# Patient Record
Sex: MALE | Race: WHITE | NOT HISPANIC OR LATINO | Employment: FULL TIME | URBAN - METROPOLITAN AREA
[De-identification: names, ages, dates, MRNs, and addresses within clinical notes are randomized per-mention and may not be internally consistent; named-entity substitution may affect disease eponyms.]

---

## 2023-10-19 ENCOUNTER — TELEPHONE (OUTPATIENT)
Dept: PRIMARY CARE | Facility: CLINIC | Age: 63
End: 2023-10-19
Payer: COMMERCIAL

## 2023-10-27 ENCOUNTER — TELEPHONE (OUTPATIENT)
Dept: PRIMARY CARE | Facility: CLINIC | Age: 63
End: 2023-10-27
Payer: COMMERCIAL

## 2024-02-01 ENCOUNTER — APPOINTMENT (OUTPATIENT)
Dept: RADIOLOGY | Facility: HOSPITAL | Age: 64
DRG: 190 | End: 2024-02-01
Payer: COMMERCIAL

## 2024-02-01 ENCOUNTER — HOSPITAL ENCOUNTER (INPATIENT)
Facility: HOSPITAL | Age: 64
LOS: 4 days | Discharge: HOME | DRG: 190 | End: 2024-02-05
Attending: INTERNAL MEDICINE | Admitting: INTERNAL MEDICINE
Payer: COMMERCIAL

## 2024-02-01 DIAGNOSIS — J93.9 PNEUMOTHORAX: Primary | ICD-10-CM

## 2024-02-01 DIAGNOSIS — J18.9 PNEUMONIA OF RIGHT MIDDLE LOBE DUE TO INFECTIOUS ORGANISM: ICD-10-CM

## 2024-02-01 PROBLEM — J44.1 CHRONIC OBSTRUCTIVE PULMONARY DISEASE WITH ACUTE EXACERBATION (MULTI): Status: ACTIVE | Noted: 2024-02-01

## 2024-02-01 PROBLEM — J93.11 PRIMARY SPONTANEOUS PNEUMOTHORAX: Status: ACTIVE | Noted: 2024-02-01

## 2024-02-01 LAB
ANION GAP SERPL CALC-SCNC: 14 MMOL/L (ref 10–20)
BUN SERPL-MCNC: 17 MG/DL (ref 6–23)
CALCIUM SERPL-MCNC: 9.6 MG/DL (ref 8.6–10.3)
CARDIAC TROPONIN I PNL SERPL HS: 5 NG/L (ref 0–20)
CHLORIDE SERPL-SCNC: 99 MMOL/L (ref 98–107)
CO2 SERPL-SCNC: 27 MMOL/L (ref 21–32)
CREAT SERPL-MCNC: 0.97 MG/DL (ref 0.5–1.3)
EGFRCR SERPLBLD CKD-EPI 2021: 87 ML/MIN/1.73M*2
ERYTHROCYTE [DISTWIDTH] IN BLOOD BY AUTOMATED COUNT: 12.4 % (ref 11.5–14.5)
GLUCOSE SERPL-MCNC: 102 MG/DL (ref 74–99)
HCT VFR BLD AUTO: 47.5 % (ref 41–52)
HGB BLD-MCNC: 16.1 G/DL (ref 13.5–17.5)
MCH RBC QN AUTO: 32.2 PG (ref 26–34)
MCHC RBC AUTO-ENTMCNC: 33.9 G/DL (ref 32–36)
MCV RBC AUTO: 95 FL (ref 80–100)
NRBC BLD-RTO: 0 /100 WBCS (ref 0–0)
PLATELET # BLD AUTO: 239 X10*3/UL (ref 150–450)
POTASSIUM SERPL-SCNC: 4.4 MMOL/L (ref 3.5–5.3)
RBC # BLD AUTO: 5 X10*6/UL (ref 4.5–5.9)
SODIUM SERPL-SCNC: 136 MMOL/L (ref 136–145)
WBC # BLD AUTO: 12.9 X10*3/UL (ref 4.4–11.3)

## 2024-02-01 PROCEDURE — 2500000002 HC RX 250 W HCPCS SELF ADMINISTERED DRUGS (ALT 637 FOR MEDICARE OP, ALT 636 FOR OP/ED): Performed by: PHYSICIAN ASSISTANT

## 2024-02-01 PROCEDURE — 71045 X-RAY EXAM CHEST 1 VIEW: CPT

## 2024-02-01 PROCEDURE — 36415 COLL VENOUS BLD VENIPUNCTURE: CPT | Performed by: PHYSICIAN ASSISTANT

## 2024-02-01 PROCEDURE — 85027 COMPLETE CBC AUTOMATED: CPT | Performed by: PHYSICIAN ASSISTANT

## 2024-02-01 PROCEDURE — 94640 AIRWAY INHALATION TREATMENT: CPT

## 2024-02-01 PROCEDURE — 99223 1ST HOSP IP/OBS HIGH 75: CPT | Performed by: PHYSICIAN ASSISTANT

## 2024-02-01 PROCEDURE — 80048 BASIC METABOLIC PNL TOTAL CA: CPT | Performed by: PHYSICIAN ASSISTANT

## 2024-02-01 PROCEDURE — 2500000005 HC RX 250 GENERAL PHARMACY W/O HCPCS: Performed by: PHYSICIAN ASSISTANT

## 2024-02-01 PROCEDURE — 84484 ASSAY OF TROPONIN QUANT: CPT | Performed by: PHYSICIAN ASSISTANT

## 2024-02-01 PROCEDURE — 1200000002 HC GENERAL ROOM WITH TELEMETRY DAILY

## 2024-02-01 PROCEDURE — 71045 X-RAY EXAM CHEST 1 VIEW: CPT | Performed by: RADIOLOGY

## 2024-02-01 PROCEDURE — 2500000001 HC RX 250 WO HCPCS SELF ADMINISTERED DRUGS (ALT 637 FOR MEDICARE OP): Performed by: PHYSICIAN ASSISTANT

## 2024-02-01 PROCEDURE — 99223 1ST HOSP IP/OBS HIGH 75: CPT | Performed by: INTERNAL MEDICINE

## 2024-02-01 PROCEDURE — 2500000002 HC RX 250 W HCPCS SELF ADMINISTERED DRUGS (ALT 637 FOR MEDICARE OP, ALT 636 FOR OP/ED): Performed by: PHARMACIST

## 2024-02-01 RX ORDER — IPRATROPIUM BROMIDE AND ALBUTEROL SULFATE 2.5; .5 MG/3ML; MG/3ML
3 SOLUTION RESPIRATORY (INHALATION)
Status: DISCONTINUED | OUTPATIENT
Start: 2024-02-01 | End: 2024-02-01

## 2024-02-01 RX ORDER — ACETAMINOPHEN 650 MG/1
650 SUPPOSITORY RECTAL EVERY 4 HOURS PRN
Status: DISCONTINUED | OUTPATIENT
Start: 2024-02-01 | End: 2024-02-05 | Stop reason: HOSPADM

## 2024-02-01 RX ORDER — PANTOPRAZOLE SODIUM 40 MG/10ML
40 INJECTION, POWDER, LYOPHILIZED, FOR SOLUTION INTRAVENOUS
Status: DISCONTINUED | OUTPATIENT
Start: 2024-02-02 | End: 2024-02-04

## 2024-02-01 RX ORDER — ACETAMINOPHEN 325 MG/1
650 TABLET ORAL EVERY 4 HOURS PRN
Status: DISCONTINUED | OUTPATIENT
Start: 2024-02-01 | End: 2024-02-05 | Stop reason: HOSPADM

## 2024-02-01 RX ORDER — FORMOTEROL FUMARATE DIHYDRATE 20 UG/2ML
20 SOLUTION RESPIRATORY (INHALATION)
Status: DISCONTINUED | OUTPATIENT
Start: 2024-02-01 | End: 2024-02-04

## 2024-02-01 RX ORDER — HEPARIN SODIUM 5000 [USP'U]/ML
5000 INJECTION, SOLUTION INTRAVENOUS; SUBCUTANEOUS ONCE
Status: DISCONTINUED | OUTPATIENT
Start: 2024-02-01 | End: 2024-02-02

## 2024-02-01 RX ORDER — ALBUTEROL SULFATE 0.83 MG/ML
2.5 SOLUTION RESPIRATORY (INHALATION) EVERY 4 HOURS PRN
Status: DISCONTINUED | OUTPATIENT
Start: 2024-02-01 | End: 2024-02-04

## 2024-02-01 RX ORDER — TRAMADOL HYDROCHLORIDE 50 MG/1
25 TABLET ORAL EVERY 6 HOURS PRN
Status: DISCONTINUED | OUTPATIENT
Start: 2024-02-01 | End: 2024-02-04

## 2024-02-01 RX ORDER — PANTOPRAZOLE SODIUM 40 MG/1
40 TABLET, DELAYED RELEASE ORAL
Status: DISCONTINUED | OUTPATIENT
Start: 2024-02-02 | End: 2024-02-04

## 2024-02-01 RX ORDER — IPRATROPIUM BROMIDE AND ALBUTEROL SULFATE 2.5; .5 MG/3ML; MG/3ML
3 SOLUTION RESPIRATORY (INHALATION)
Status: DISCONTINUED | OUTPATIENT
Start: 2024-02-02 | End: 2024-02-04

## 2024-02-01 RX ORDER — SENNOSIDES 8.6 MG/1
2 TABLET ORAL 2 TIMES DAILY
Status: DISCONTINUED | OUTPATIENT
Start: 2024-02-01 | End: 2024-02-05 | Stop reason: HOSPADM

## 2024-02-01 RX ORDER — ALBUTEROL SULFATE 90 UG/1
2 AEROSOL, METERED RESPIRATORY (INHALATION) EVERY 4 HOURS PRN
COMMUNITY

## 2024-02-01 RX ORDER — ALBUTEROL SULFATE 0.63 MG/3ML
0.63 SOLUTION RESPIRATORY (INHALATION) EVERY 6 HOURS PRN
COMMUNITY
Start: 2023-11-02

## 2024-02-01 RX ORDER — BUDESONIDE 0.5 MG/2ML
0.5 INHALANT ORAL
Status: DISCONTINUED | OUTPATIENT
Start: 2024-02-01 | End: 2024-02-04

## 2024-02-01 RX ORDER — ONDANSETRON 4 MG/1
4 TABLET, ORALLY DISINTEGRATING ORAL EVERY 8 HOURS PRN
Status: DISCONTINUED | OUTPATIENT
Start: 2024-02-01 | End: 2024-02-05 | Stop reason: HOSPADM

## 2024-02-01 RX ORDER — TALC
3 POWDER (GRAM) TOPICAL DAILY
Status: DISCONTINUED | OUTPATIENT
Start: 2024-02-01 | End: 2024-02-05 | Stop reason: HOSPADM

## 2024-02-01 RX ORDER — FLUTICASONE FUROATE AND VILANTEROL 100; 25 UG/1; UG/1
1 POWDER RESPIRATORY (INHALATION)
Status: DISCONTINUED | OUTPATIENT
Start: 2024-02-01 | End: 2024-02-01

## 2024-02-01 RX ORDER — OXYCODONE HYDROCHLORIDE 5 MG/1
5 TABLET ORAL EVERY 4 HOURS PRN
Status: DISCONTINUED | OUTPATIENT
Start: 2024-02-01 | End: 2024-02-05 | Stop reason: HOSPADM

## 2024-02-01 RX ORDER — ACETAMINOPHEN 160 MG/5ML
650 SOLUTION ORAL EVERY 4 HOURS PRN
Status: DISCONTINUED | OUTPATIENT
Start: 2024-02-01 | End: 2024-02-05 | Stop reason: HOSPADM

## 2024-02-01 RX ORDER — HEPARIN SODIUM 5000 [USP'U]/ML
5000 INJECTION, SOLUTION INTRAVENOUS; SUBCUTANEOUS EVERY 8 HOURS
Status: DISCONTINUED | OUTPATIENT
Start: 2024-02-01 | End: 2024-02-04

## 2024-02-01 RX ORDER — ONDANSETRON HYDROCHLORIDE 2 MG/ML
4 INJECTION, SOLUTION INTRAVENOUS EVERY 8 HOURS PRN
Status: DISCONTINUED | OUTPATIENT
Start: 2024-02-01 | End: 2024-02-05 | Stop reason: HOSPADM

## 2024-02-01 RX ADMIN — OXYCODONE HYDROCHLORIDE 5 MG: 5 TABLET ORAL at 20:57

## 2024-02-01 RX ADMIN — FORMOTEROL FUMARATE DIHYDRATE 20 MCG: 20 SOLUTION RESPIRATORY (INHALATION) at 20:14

## 2024-02-01 RX ADMIN — IPRATROPIUM BROMIDE AND ALBUTEROL SULFATE 3 ML: 2.5; .5 SOLUTION RESPIRATORY (INHALATION) at 20:13

## 2024-02-01 RX ADMIN — Medication 3 L/MIN: at 18:15

## 2024-02-01 RX ADMIN — BUDESONIDE 0.5 MG: 0.5 INHALANT RESPIRATORY (INHALATION) at 20:14

## 2024-02-01 SDOH — SOCIAL STABILITY: SOCIAL INSECURITY: ARE THERE ANY APPARENT SIGNS OF INJURIES/BEHAVIORS THAT COULD BE RELATED TO ABUSE/NEGLECT?: NO

## 2024-02-01 SDOH — SOCIAL STABILITY: SOCIAL INSECURITY: ARE YOU OR HAVE YOU BEEN THREATENED OR ABUSED PHYSICALLY, EMOTIONALLY, OR SEXUALLY BY ANYONE?: NO

## 2024-02-01 SDOH — SOCIAL STABILITY: SOCIAL INSECURITY: HAVE YOU HAD THOUGHTS OF HARMING ANYONE ELSE?: NO

## 2024-02-01 SDOH — SOCIAL STABILITY: SOCIAL INSECURITY: WERE YOU ABLE TO COMPLETE ALL THE BEHAVIORAL HEALTH SCREENINGS?: YES

## 2024-02-01 SDOH — SOCIAL STABILITY: SOCIAL INSECURITY: ABUSE: ADULT

## 2024-02-01 SDOH — SOCIAL STABILITY: SOCIAL INSECURITY: DO YOU FEEL UNSAFE GOING BACK TO THE PLACE WHERE YOU ARE LIVING?: NO

## 2024-02-01 SDOH — SOCIAL STABILITY: SOCIAL INSECURITY: HAS ANYONE EVER THREATENED TO HURT YOUR FAMILY OR YOUR PETS?: NO

## 2024-02-01 SDOH — SOCIAL STABILITY: SOCIAL INSECURITY: DOES ANYONE TRY TO KEEP YOU FROM HAVING/CONTACTING OTHER FRIENDS OR DOING THINGS OUTSIDE YOUR HOME?: NO

## 2024-02-01 SDOH — SOCIAL STABILITY: SOCIAL INSECURITY: DO YOU FEEL ANYONE HAS EXPLOITED OR TAKEN ADVANTAGE OF YOU FINANCIALLY OR OF YOUR PERSONAL PROPERTY?: NO

## 2024-02-01 ASSESSMENT — COGNITIVE AND FUNCTIONAL STATUS - GENERAL
MOBILITY SCORE: 24
PATIENT BASELINE BEDBOUND: NO
MOBILITY SCORE: 24
DAILY ACTIVITIY SCORE: 24
MOBILITY SCORE: 24

## 2024-02-01 ASSESSMENT — LIFESTYLE VARIABLES
AUDIT-C TOTAL SCORE: 0
SUBSTANCE_ABUSE_PAST_12_MONTHS: NO
AUDIT-C TOTAL SCORE: 0
HOW OFTEN DO YOU HAVE A DRINK CONTAINING ALCOHOL: NEVER
PRESCIPTION_ABUSE_PAST_12_MONTHS: NO
SKIP TO QUESTIONS 9-10: 1
HOW MANY STANDARD DRINKS CONTAINING ALCOHOL DO YOU HAVE ON A TYPICAL DAY: PATIENT DOES NOT DRINK
HOW OFTEN DO YOU HAVE 6 OR MORE DRINKS ON ONE OCCASION: NEVER

## 2024-02-01 ASSESSMENT — PAIN - FUNCTIONAL ASSESSMENT
PAIN_FUNCTIONAL_ASSESSMENT: 0-10

## 2024-02-01 ASSESSMENT — ACTIVITIES OF DAILY LIVING (ADL)
DRESSING YOURSELF: INDEPENDENT
JUDGMENT_ADEQUATE_SAFELY_COMPLETE_DAILY_ACTIVITIES: YES
PATIENT'S MEMORY ADEQUATE TO SAFELY COMPLETE DAILY ACTIVITIES?: YES
FEEDING YOURSELF: INDEPENDENT
HEARING - LEFT EAR: FUNCTIONAL
GROOMING: INDEPENDENT
BATHING: INDEPENDENT
ADEQUATE_TO_COMPLETE_ADL: YES
TOILETING: INDEPENDENT
HEARING - RIGHT EAR: FUNCTIONAL
WALKS IN HOME: INDEPENDENT

## 2024-02-01 ASSESSMENT — COLUMBIA-SUICIDE SEVERITY RATING SCALE - C-SSRS
1. IN THE PAST MONTH, HAVE YOU WISHED YOU WERE DEAD OR WISHED YOU COULD GO TO SLEEP AND NOT WAKE UP?: NO
6. HAVE YOU EVER DONE ANYTHING, STARTED TO DO ANYTHING, OR PREPARED TO DO ANYTHING TO END YOUR LIFE?: NO
2. HAVE YOU ACTUALLY HAD ANY THOUGHTS OF KILLING YOURSELF?: NO

## 2024-02-01 ASSESSMENT — PAIN SCALES - GENERAL
PAINLEVEL_OUTOF10: 0 - NO PAIN
PAINLEVEL_OUTOF10: 0 - NO PAIN
PAINLEVEL_OUTOF10: 7

## 2024-02-01 ASSESSMENT — PATIENT HEALTH QUESTIONNAIRE - PHQ9
1. LITTLE INTEREST OR PLEASURE IN DOING THINGS: NOT AT ALL
2. FEELING DOWN, DEPRESSED OR HOPELESS: NOT AT ALL
SUM OF ALL RESPONSES TO PHQ9 QUESTIONS 1 & 2: 0

## 2024-02-01 ASSESSMENT — PAIN DESCRIPTION - ORIENTATION: ORIENTATION: RIGHT

## 2024-02-01 ASSESSMENT — PAIN DESCRIPTION - LOCATION: LOCATION: CHEST

## 2024-02-01 NOTE — CARE PLAN
The patient's goals for the shift include  no SOB    The clinical goals for the shift include Pt will remain HDS      Problem: Pain  Goal: My pain/discomfort is manageable  Outcome: Progressing     Problem: Safety  Goal: I will remain free of falls  Outcome: Progressing     Problem: Daily Care  Goal: Daily care needs are met  Outcome: Progressing     Problem: Psychosocial Needs  Goal: Demonstrates ability to cope with hospitalization/illness  Outcome: Progressing

## 2024-02-01 NOTE — CONSULTS
Inpatient consult to Pulmonology  Consult performed by: Yasmine Richard MD  Consult ordered by: Chandrika Delacruz PA-C      Reason For Consult  COPD and spontaneous pneumothorax  History Of Present Illness  Santi Rivera is a 63 y.o. male  with h/o COPD, GERD, SBO, who p/w acute onset SOB x 1 which started while walking his dogs to OSH ED. SOB both at rest and with activity, associated with chest tightness, and severe to the point it would prevent him from sleeping. In the ED work up revealed R sided spontaneous pneumothorax, subsequently R sided CT was placed. Given need for CT management, he was placed on 4L NC and transferred to [facility] for further management. Pulmonary is consulted for COPD and chest tube management.     States he developed a sudden onset SOB yesterday while walking his dogs. Associated with CP, both at rest and with activity. symptoms now improved. At baseline no SOB at rest. But XIE after walking 1/2 city blocks or climbing 1/2 FOS. Denies orthopnea, PND or LE edema. XIE associated with wheezing. Both improve with albuterol.   Also complains of a chronic cough, productive of clear sputum. No hemoptysis.   Full ROS as below.    Past Medical History  History reviewed. No pertinent past medical history.  Surgical History  Past Surgical History:   Procedure Laterality Date    ABDOMINAL ADHESION SURGERY  12/04/2017    Laparoscopic Lysis Of Intestinal Adhesions    EYE SURGERY  12/07/2017    Eye Surgery    HERNIA REPAIR  12/07/2017    Hernia Repair   Social History  Active smoker, 1 PPD x 45 years h/o smoking, , no known exposures.   Social History     Tobacco Use    Smoking status: Every Day     Packs/day: 0.50     Years: 45.00     Additional pack years: 0.00     Total pack years: 22.50     Types: Cigarettes    Smokeless tobacco: Never   Substance Use Topics    Alcohol use: Not Currently   Family History  +ve for cancer and COPD.  Current Outpatient Medications   Medication Instructions     albuterol 90 mcg/actuation inhaler 2 puffs, inhalation, Every 4 hours PRN    albuterol 0.63 mg, nebulization, Every 6 hours PRN    fluticasone-umeclidin-vilanter (TRELEGY-ELLIPTA) 100-62.5-25 mcg blister with device 1 puff, inhalation, Daily   Allergies  Amoxicillin  Review of Systems   Constitutional:  Positive for fatigue. Negative for appetite change, chills, fever and unexpected weight change.   HENT:  Positive for rhinorrhea. Negative for congestion, postnasal drip, sinus pressure, sinus pain and sore throat.    Eyes:  Negative for pain, redness, itching and visual disturbance.   Respiratory:  Positive for cough, chest tightness, shortness of breath and wheezing.    Cardiovascular:  Positive for chest pain. Negative for palpitations and leg swelling.   Gastrointestinal:  Positive for abdominal pain. Negative for constipation, diarrhea, nausea and vomiting.   Genitourinary:  Negative for dysuria, frequency and hematuria.   Musculoskeletal:  Negative for arthralgias, back pain, myalgias and neck pain.   Skin:  Negative for pallor, rash and wound.   Neurological:  Negative for dizziness, seizures, syncope, light-headedness and headaches.   Psychiatric/Behavioral:  Negative for confusion and dysphoric mood. The patient is not nervous/anxious.    Scheduled medications  budesonide, 0.5 mg, nebulization, BID  formoterol, 20 mcg, nebulization, BID  heparin (porcine), 5,000 Units, subcutaneous, q8h  heparin (porcine), 5,000 Units, subcutaneous, Once  ipratropium-albuteroL, 3 mL, nebulization, q6h  melatonin, 3 mg, oral, Daily  [START ON 2/2/2024] pantoprazole, 40 mg, oral, Daily before breakfast   Or  [START ON 2/2/2024] pantoprazole, 40 mg, intravenous, Daily before breakfast  sennosides, 2 tablet, oral, BID  tiotropium, 2 Inhalation, inhalation, Daily    Continuous medications  oxygen, , Last Rate: 3 L/min (02/01/24 1815)    PRN medications  PRN medications: acetaminophen **OR** acetaminophen **OR** acetaminophen,  "albuterol, ondansetron ODT **OR** ondansetron, oxyCODONE, traMADol   Physical Exam  Constitutional:       General: He is not in acute distress.     Appearance: Normal appearance. He is not ill-appearing or toxic-appearing.      Comments: Thin   HENT:      Head: Normocephalic and atraumatic.      Nose:      Comments: On 4L NC.      Mouth/Throat:      Mouth: Mucous membranes are moist.      Comments: Mallampati 3-4, poor oral intake.   Eyes:      General: No scleral icterus.     Extraocular Movements: Extraocular movements intact.      Pupils: Pupils are equal, round, and reactive to light.   Cardiovascular:      Rate and Rhythm: Normal rate and regular rhythm.      Heart sounds: No murmur heard.     No friction rub. No gallop.   Pulmonary:      Effort: Pulmonary effort is normal. No respiratory distress.      Breath sounds: Rhonchi present. No wheezing or rales.      Comments: R CT in place, without air leak. Poor air entry with bibasilar rhonchi.   Abdominal:      General: Abdomen is flat. There is no distension.      Palpations: Abdomen is soft.      Tenderness: There is no abdominal tenderness.   Musculoskeletal:         General: No swelling or tenderness. Normal range of motion.      Cervical back: Normal range of motion and neck supple. No rigidity.      Right lower leg: No edema.      Left lower leg: No edema.   Lymphadenopathy:      Cervical: No cervical adenopathy.   Skin:     General: Skin is warm and dry.      Coloration: Skin is not jaundiced.      Findings: No bruising.   Neurological:      General: No focal deficit present.      Mental Status: He is alert and oriented to person, place, and time.      Cranial Nerves: No cranial nerve deficit.      Motor: No weakness.   Psychiatric:         Mood and Affect: Mood normal.         Behavior: Behavior normal.     Vital Signs  Blood pressure 109/51, pulse 85, temperature 35.9 °C (96.6 °F), temperature source Temporal, resp. rate 18, height 1.753 m (5' 9.02\"), " weight 50.6 kg (111 lb 8.8 oz), SpO2 97 %.  Oxygen Therapy  SpO2: 97 %           Relevant Results  No results found for this or any previous visit (from the past 24 hour(s)).   No results found.      Assessment/Plan   63 YOM with h/o COPD, GERD, SBO, who p/w acute onset SOB x 1 which started while walking his dogs to OSH ED. SOB both at rest and with activity, associated with chest tightness, and severe to the point it would prevent him from sleeping. In the ED work up revealed R sided spontaneous pneumothorax, subsequently R sided CT was placed. Given need for CT management, he was placed on 4L NC and transferred to [facility] for further management. Pulmonary is consulted for COPD and chest tube management.     Pneumothorax: spontaneous, first episode, likely due to a bullae, s/p R sided CT placement with near complete resolution      Continue CT to wall suction -20      Daily chest CT, if no recurrence, will place to water seal tomorrow      Might need further work up including chest CT      Pain control    COPD: per notes, sees Dr. Johnson as outpatient, currently on Trelegy and albuterol at home, infrequent use of albuterol. No PFT in our EMR. CT report from 2023 read as severe emphysema      Continue Budesonide, formoterol and Spiriva      Duo-Neb      Would DC on home inhalers on DC      FU with private pulmonologist after DC    Respiratory failure: acute with hypoxic due to above.      Continue supplemental O2, wean off as tolerates      Home O2 evaluation before DC    Smoking: active smoker      Smoking cessation counseling      Nicotine patch if needed    DVT prophylaxis: with subcutaneous heparin    Thank you for the consult.  Pulmonary will FU while in house.     Yasmine Richard MD

## 2024-02-02 ENCOUNTER — APPOINTMENT (OUTPATIENT)
Dept: RADIOLOGY | Facility: HOSPITAL | Age: 64
DRG: 190 | End: 2024-02-02
Payer: COMMERCIAL

## 2024-02-02 LAB
ANION GAP SERPL CALC-SCNC: 13 MMOL/L (ref 10–20)
BUN SERPL-MCNC: 18 MG/DL (ref 6–23)
CALCIUM SERPL-MCNC: 8.7 MG/DL (ref 8.6–10.3)
CHLORIDE SERPL-SCNC: 102 MMOL/L (ref 98–107)
CO2 SERPL-SCNC: 24 MMOL/L (ref 21–32)
CREAT SERPL-MCNC: 0.85 MG/DL (ref 0.5–1.3)
EGFRCR SERPLBLD CKD-EPI 2021: >90 ML/MIN/1.73M*2
ERYTHROCYTE [DISTWIDTH] IN BLOOD BY AUTOMATED COUNT: 12.3 % (ref 11.5–14.5)
GLUCOSE SERPL-MCNC: 96 MG/DL (ref 74–99)
HCT VFR BLD AUTO: 43.4 % (ref 41–52)
HGB BLD-MCNC: 14.3 G/DL (ref 13.5–17.5)
MCH RBC QN AUTO: 32.4 PG (ref 26–34)
MCHC RBC AUTO-ENTMCNC: 32.9 G/DL (ref 32–36)
MCV RBC AUTO: 98 FL (ref 80–100)
NRBC BLD-RTO: 0 /100 WBCS (ref 0–0)
PLATELET # BLD AUTO: 127 X10*3/UL (ref 150–450)
POTASSIUM SERPL-SCNC: 4.8 MMOL/L (ref 3.5–5.3)
RBC # BLD AUTO: 4.41 X10*6/UL (ref 4.5–5.9)
SODIUM SERPL-SCNC: 134 MMOL/L (ref 136–145)
WBC # BLD AUTO: 13.7 X10*3/UL (ref 4.4–11.3)

## 2024-02-02 PROCEDURE — 99232 SBSQ HOSP IP/OBS MODERATE 35: CPT | Performed by: STUDENT IN AN ORGANIZED HEALTH CARE EDUCATION/TRAINING PROGRAM

## 2024-02-02 PROCEDURE — 94640 AIRWAY INHALATION TREATMENT: CPT

## 2024-02-02 PROCEDURE — 80048 BASIC METABOLIC PNL TOTAL CA: CPT | Performed by: PHYSICIAN ASSISTANT

## 2024-02-02 PROCEDURE — 2500000005 HC RX 250 GENERAL PHARMACY W/O HCPCS: Performed by: PHYSICIAN ASSISTANT

## 2024-02-02 PROCEDURE — 2500000002 HC RX 250 W HCPCS SELF ADMINISTERED DRUGS (ALT 637 FOR MEDICARE OP, ALT 636 FOR OP/ED): Performed by: INTERNAL MEDICINE

## 2024-02-02 PROCEDURE — 2500000002 HC RX 250 W HCPCS SELF ADMINISTERED DRUGS (ALT 637 FOR MEDICARE OP, ALT 636 FOR OP/ED): Performed by: PHARMACIST

## 2024-02-02 PROCEDURE — 85027 COMPLETE CBC AUTOMATED: CPT | Performed by: PHYSICIAN ASSISTANT

## 2024-02-02 PROCEDURE — 2500000001 HC RX 250 WO HCPCS SELF ADMINISTERED DRUGS (ALT 637 FOR MEDICARE OP): Performed by: PHYSICIAN ASSISTANT

## 2024-02-02 PROCEDURE — 71045 X-RAY EXAM CHEST 1 VIEW: CPT

## 2024-02-02 PROCEDURE — 2500000002 HC RX 250 W HCPCS SELF ADMINISTERED DRUGS (ALT 637 FOR MEDICARE OP, ALT 636 FOR OP/ED): Performed by: PHYSICIAN ASSISTANT

## 2024-02-02 PROCEDURE — 36415 COLL VENOUS BLD VENIPUNCTURE: CPT | Performed by: PHYSICIAN ASSISTANT

## 2024-02-02 PROCEDURE — 1200000002 HC GENERAL ROOM WITH TELEMETRY DAILY

## 2024-02-02 PROCEDURE — 99233 SBSQ HOSP IP/OBS HIGH 50: CPT | Performed by: INTERNAL MEDICINE

## 2024-02-02 RX ADMIN — FORMOTEROL FUMARATE DIHYDRATE 20 MCG: 20 SOLUTION RESPIRATORY (INHALATION) at 08:43

## 2024-02-02 RX ADMIN — OXYCODONE HYDROCHLORIDE 5 MG: 5 TABLET ORAL at 12:12

## 2024-02-02 RX ADMIN — IPRATROPIUM BROMIDE AND ALBUTEROL SULFATE 3 ML: 2.5; .5 SOLUTION RESPIRATORY (INHALATION) at 15:39

## 2024-02-02 RX ADMIN — TIOTROPIUM BROMIDE INHALATION SPRAY 2 PUFF: 3.12 SPRAY, METERED RESPIRATORY (INHALATION) at 09:06

## 2024-02-02 RX ADMIN — IPRATROPIUM BROMIDE AND ALBUTEROL SULFATE 3 ML: 2.5; .5 SOLUTION RESPIRATORY (INHALATION) at 08:44

## 2024-02-02 RX ADMIN — FORMOTEROL FUMARATE DIHYDRATE 20 MCG: 20 SOLUTION RESPIRATORY (INHALATION) at 19:23

## 2024-02-02 RX ADMIN — IPRATROPIUM BROMIDE AND ALBUTEROL SULFATE 3 ML: 2.5; .5 SOLUTION RESPIRATORY (INHALATION) at 19:23

## 2024-02-02 RX ADMIN — BUDESONIDE 0.5 MG: 0.5 INHALANT RESPIRATORY (INHALATION) at 08:43

## 2024-02-02 RX ADMIN — BUDESONIDE 0.5 MG: 0.5 INHALANT RESPIRATORY (INHALATION) at 19:23

## 2024-02-02 RX ADMIN — Medication 3 L/MIN: at 19:23

## 2024-02-02 ASSESSMENT — ENCOUNTER SYMPTOMS
SHORTNESS OF BREATH: 1
CHEST TIGHTNESS: 1
FEVER: 0
SORE THROAT: 0
HEADACHES: 0
COUGH: 1
PALPITATIONS: 0
NECK PAIN: 0
CONFUSION: 0
WHEEZING: 1
SINUS PRESSURE: 0
ABDOMINAL PAIN: 1
DIZZINESS: 0
DIARRHEA: 0
MYALGIAS: 0
RHINORRHEA: 1
UNEXPECTED WEIGHT CHANGE: 0
VOMITING: 0
NERVOUS/ANXIOUS: 0
EYE ITCHING: 0
CHILLS: 0
ABDOMINAL DISTENTION: 0
APPETITE CHANGE: 0
DYSPHORIC MOOD: 0
SINUS PAIN: 0
BACK PAIN: 0
EYE PAIN: 0
NAUSEA: 0
SHORTNESS OF BREATH: 0
HEMATURIA: 0
SEIZURES: 0
FREQUENCY: 0
DYSURIA: 0
ARTHRALGIAS: 0
WOUND: 0
ABDOMINAL PAIN: 0
LIGHT-HEADEDNESS: 0
CONSTIPATION: 0
FATIGUE: 1
EYE REDNESS: 0

## 2024-02-02 ASSESSMENT — COGNITIVE AND FUNCTIONAL STATUS - GENERAL
DAILY ACTIVITIY SCORE: 24
DAILY ACTIVITIY SCORE: 24
MOBILITY SCORE: 24
MOBILITY SCORE: 24

## 2024-02-02 ASSESSMENT — PAIN SCALES - GENERAL
PAINLEVEL_OUTOF10: 4
PAINLEVEL_OUTOF10: 0 - NO PAIN
PAINLEVEL_OUTOF10: 7
PAINLEVEL_OUTOF10: 0 - NO PAIN

## 2024-02-02 ASSESSMENT — PAIN DESCRIPTION - LOCATION: LOCATION: CHEST

## 2024-02-02 ASSESSMENT — PAIN DESCRIPTION - ORIENTATION: ORIENTATION: RIGHT

## 2024-02-02 ASSESSMENT — PAIN - FUNCTIONAL ASSESSMENT: PAIN_FUNCTIONAL_ASSESSMENT: 0-10

## 2024-02-02 NOTE — PROGRESS NOTES
Pharmacy Medication History Review    Santi Rivera is a 63 y.o. male admitted for Primary spontaneous pneumothorax. Pharmacy reviewed the patient's [id number] medications and allergies for accuracy.    The list below reflectives the updated PTA list. Please review each medication in order reconciliation for additional clarification and justification.  Medications Prior to Admission   Medication Sig Dispense Refill Last Dose    albuterol 0.63 mg/3 mL nebulizer solution Take 3 mL (0.63 mg) by nebulization every 6 hours if needed for shortness of breath or wheezing.       fluticasone-umeclidin-vilanter (TRELEGY-ELLIPTA) 100-62.5-25 mcg blister with device Inhale 1 puff once daily.       albuterol 90 mcg/actuation inhaler Inhale 2 puffs every 4 hours if needed.           The list below reflectives the updated allergy list. Please review each documented allergy for additional clarification and justification.  Allergies  Reviewed by Chandrika Delacruz PA-C on 2/1/2024        Severity Reactions Comments    Amoxicillin Low Rash             Below are additional concerns with the patient's PTA list.  Prior to Admission Medications   Prescriptions Last Dose Informant Patient Reported? Taking?   albuterol 0.63 mg/3 mL nebulizer solution   Yes Yes   Sig: Take 3 mL (0.63 mg) by nebulization every 6 hours if needed for shortness of breath or wheezing.   albuterol 90 mcg/actuation inhaler   Yes No   Sig: Inhale 2 puffs every 4 hours if needed.   fluticasone-umeclidin-vilanter (TRELEGY-ELLIPTA) 100-62.5-25 mcg blister with device   Yes Yes   Sig: Inhale 1 puff once daily.      Facility-Administered Medications: None    PER PATIENT    Shirley Banuelos, Diony

## 2024-02-02 NOTE — CARE PLAN
Problem: Pain  Goal: My pain/discomfort is manageable  Outcome: Progressing     Problem: Safety  Goal: Patient will be injury free during hospitalization  Outcome: Progressing  Goal: I will remain free of falls  Outcome: Progressing     Problem: Daily Care  Goal: Daily care needs are met  Outcome: Progressing     Problem: Psychosocial Needs  Goal: Demonstrates ability to cope with hospitalization/illness  Outcome: Progressing  Goal: Collaborate with me, my family, and caregiver to identify my specific goals  Outcome: Progressing  Flowsheets (Taken 2/1/2024 2592)  Cultural Requests During Hospitalization: none  Spiritual Requests During Hospitalization: none     Problem: Discharge Barriers  Goal: My discharge needs are met  Outcome: Progressing   The patient's goals for the shift include      The clinical goals for the shift include Pt will remain HDS

## 2024-02-02 NOTE — PROGRESS NOTES
02/02/24 0950   Discharge Planning   Living Arrangements Spouse/significant other   Support Systems Spouse/significant other   Assistance Needed Attempted to reach patient by phone no gustavo, information gathered from chart. Patient is A&OX3, Independent and does not use any assistive devices.   Type of Residence Private residence   Home or Post Acute Services None   Patient expects to be discharged to: Patient plan is to D/C home with no needs once medically clear.   Does the patient need discharge transport arranged? No

## 2024-02-02 NOTE — PROGRESS NOTES
Santi Rivera is a 63 y.o. male on day 1 of admission presenting with Primary spontaneous pneumothorax.    Patient with h/o COPD, GERD, SBO, who p/w acute onset SOB x 1 which started while walking his dogs to H ED. SOB both at rest and with activity, associated with chest tightness, and severe to the point it would prevent him from sleeping. In the ED work up revealed R sided spontaneous pneumothorax, subsequently R sided CT was placed. Given need for CT management, he was placed on 4L NC and transferred to [facility] for further management. Pulmonary is consulted for COPD and chest tube management.    Subjective   No acute overnight events. Continues to remain on supplemental O2.      Complains of mild CP with deep breaths. SOB, cough and sputum at baseline. Mild wheezing today.   Objective   Scheduled medications  budesonide, 0.5 mg, nebulization, BID  formoterol, 20 mcg, nebulization, BID  heparin (porcine), 5,000 Units, subcutaneous, q8h  ipratropium-albuteroL, 3 mL, nebulization, TID  melatonin, 3 mg, oral, Daily  pantoprazole, 40 mg, oral, Daily before breakfast   Or  pantoprazole, 40 mg, intravenous, Daily before breakfast  sennosides, 2 tablet, oral, BID  tiotropium, 2 Inhalation, inhalation, Daily    Continuous medications  oxygen, , Last Rate: 3 L/min (02/02/24 0906)    PRN medications  PRN medications: acetaminophen **OR** acetaminophen **OR** acetaminophen, albuterol, ondansetron ODT **OR** ondansetron, oxyCODONE, traMADol   Physical Exam  Constitutional:       General: He is not in acute distress.     Appearance: Normal appearance. He is not ill-appearing or toxic-appearing.      Comments: Thin.   HENT:      Head: Normocephalic and atraumatic.      Nose:      Comments: On 3L NC     Mouth/Throat:      Mouth: Mucous membranes are moist.      Comments: Mallampati 3-4, poor oral hygiene  Eyes:      General: No scleral icterus.     Extraocular Movements: Extraocular movements intact.      Pupils: Pupils are  "equal, round, and reactive to light.   Cardiovascular:      Rate and Rhythm: Normal rate and regular rhythm.      Heart sounds: No murmur heard.     No friction rub. No gallop.   Pulmonary:      Effort: Pulmonary effort is normal. No respiratory distress.      Breath sounds: Rhonchi present. No wheezing or rales.      Comments: R CT in place, without air leak. Poor air entry with bibasilar rhonchi  Abdominal:      General: There is no distension.      Palpations: Abdomen is soft.      Tenderness: There is no abdominal tenderness.   Musculoskeletal:         General: No tenderness. Normal range of motion.      Cervical back: Neck supple. No rigidity or tenderness.      Right lower leg: No edema.      Left lower leg: No edema.   Lymphadenopathy:      Cervical: No cervical adenopathy.   Skin:     General: Skin is warm and dry.      Coloration: Skin is not jaundiced.      Findings: No bruising.   Neurological:      General: No focal deficit present.      Mental Status: He is alert and oriented to person, place, and time.      Cranial Nerves: No cranial nerve deficit.      Motor: No weakness.   Psychiatric:         Mood and Affect: Mood normal.         Behavior: Behavior normal.     Last Recorded Vitals  Blood pressure 104/63, pulse 71, temperature 36.8 °C (98.2 °F), temperature source Oral, resp. rate 19, height 1.753 m (5' 9.02\"), weight 50.6 kg (111 lb 8.8 oz), SpO2 92 %.  Intake/Output last 3 Shifts:  I/O last 3 completed shifts:  In: 1400 (27.7 mL/kg) [P.O.:1400]  Out: 450 (8.9 mL/kg) [Urine:450 (0.2 mL/kg/hr)]  Weight: 50.6 kg   Relevant Results  Results for orders placed or performed during the hospital encounter of 02/01/24 (from the past 24 hour(s))   CBC   Result Value Ref Range    WBC 12.9 (H) 4.4 - 11.3 x10*3/uL    nRBC 0.0 0.0 - 0.0 /100 WBCs    RBC 5.00 4.50 - 5.90 x10*6/uL    Hemoglobin 16.1 13.5 - 17.5 g/dL    Hematocrit 47.5 41.0 - 52.0 %    MCV 95 80 - 100 fL    MCH 32.2 26.0 - 34.0 pg    MCHC 33.9 32.0 " - 36.0 g/dL    RDW 12.4 11.5 - 14.5 %    Platelets 239 150 - 450 x10*3/uL   Basic metabolic panel   Result Value Ref Range    Glucose 102 (H) 74 - 99 mg/dL    Sodium 136 136 - 145 mmol/L    Potassium 4.4 3.5 - 5.3 mmol/L    Chloride 99 98 - 107 mmol/L    Bicarbonate 27 21 - 32 mmol/L    Anion Gap 14 10 - 20 mmol/L    Urea Nitrogen 17 6 - 23 mg/dL    Creatinine 0.97 0.50 - 1.30 mg/dL    eGFR 87 >60 mL/min/1.73m*2    Calcium 9.6 8.6 - 10.3 mg/dL   Troponin I, High Sensitivity   Result Value Ref Range    Troponin I, High Sensitivity 5 0 - 20 ng/L   CBC   Result Value Ref Range    WBC 13.7 (H) 4.4 - 11.3 x10*3/uL    nRBC 0.0 0.0 - 0.0 /100 WBCs    RBC 4.41 (L) 4.50 - 5.90 x10*6/uL    Hemoglobin 14.3 13.5 - 17.5 g/dL    Hematocrit 43.4 41.0 - 52.0 %    MCV 98 80 - 100 fL    MCH 32.4 26.0 - 34.0 pg    MCHC 32.9 32.0 - 36.0 g/dL    RDW 12.3 11.5 - 14.5 %    Platelets 127 (L) 150 - 450 x10*3/uL   Basic metabolic panel   Result Value Ref Range    Glucose 96 74 - 99 mg/dL    Sodium 134 (L) 136 - 145 mmol/L    Potassium 4.8 3.5 - 5.3 mmol/L    Chloride 102 98 - 107 mmol/L    Bicarbonate 24 21 - 32 mmol/L    Anion Gap 13 10 - 20 mmol/L    Urea Nitrogen 18 6 - 23 mg/dL    Creatinine 0.85 0.50 - 1.30 mg/dL    eGFR >90 >60 mL/min/1.73m*2    Calcium 8.7 8.6 - 10.3 mg/dL   XR chest 1 view    Result Date: 2/2/2024  Interpreted By:  Brenda Salas, STUDY: XR CHEST 1 VIEW;  2/1/2024 9:33 pm   INDICATION: Signs/Symptoms:treated for pneumothorax at outside hospital, chest tube in place   COMPARISON: Chest x-ray 08/26/2022.   ACCESSION NUMBER(S): [id number]   ORDERING CLINICIAN: CONCHITA BURNS   TECHNIQUE: Portable upright frontal view of the chest was obtained .   FINDINGS: Monitoring wires are overlying the patient.   There is a right-sided chest tube with the tip at the right upper hemithorax.   The cardiomediastinal silhouette is within normal limits.   No focal consolidation, pleural effusion or pneumothorax. Redemonstration of  hyperlucent lungs with flattening of the hemidiaphragms, suggestive of emphysema.       1. No radiographic evidence of acute cardiopulmonary process. Right-sided chest tube. 2. Emphysema.       MACRO: None.   Signed by: Brenda Salas 2/2/2024 1:16 AM Dictation workstation:   [id number]    Assessment/Plan   Principal Problem:    Primary spontaneous pneumothorax  Active Problems:    Chronic obstructive pulmonary disease with acute exacerbation (CMS/HCC)  63 YOM with h/o COPD, GERD, SBO, who p/w acute onset SOB x 1 which started while walking his dogs to OSH ED. SOB both at rest and with activity, associated with chest tightness, and severe to the point it would prevent him from sleeping. In the ED work up revealed R sided spontaneous pneumothorax, subsequently R sided CT was placed. Given need for CT management, he was placed on 4L NC and transferred to [facility] for further management. Pulmonary is consulted for COPD and chest tube management.      Pneumothorax: spontaneous, first episode, likely due to a bullae, s/p R sided CT placement with near complete resolution      Continue CT to wall suction -20      CXR now, if no recurrence, will place to water seal      Might need further work up including chest CT      Pain control     COPD: per notes, sees Dr. Johnson as outpatient, currently on Trelegy and albuterol at home, infrequent use of albuterol. No PFT in our EMR. CT report from 2023 read as severe emphysema      Continue Budesonide, formoterol and Spiriva      Duo-Neb      Would DC on home inhalers on DC      FU with private pulmonologist after DC     Respiratory failure: acute with hypoxic due to above. Needs slowly improving.       Continue supplemental O2, wean off as tolerates      Home O2 evaluation before DC     Smoking: active smoker      Smoking cessation counseling      Nicotine patch if needed     DVT prophylaxis: with subcutaneous heparin     Pulmonary will FU while in house.   Yasmine Richard,  MD

## 2024-02-02 NOTE — CARE PLAN
Problem: Pain  Goal: My pain/discomfort is manageable  Outcome: Progressing     Problem: Safety  Goal: Patient will be injury free during hospitalization  Outcome: Progressing  Goal: I will remain free of falls  Outcome: Progressing     Problem: Daily Care  Goal: Daily care needs are met  Outcome: Progressing     Problem: Psychosocial Needs  Goal: Demonstrates ability to cope with hospitalization/illness  Flowsheets (Taken 2/2/2024 3458)  Demonstrates ability to cope with hospitalization/illness: Encourage verbalization of feelings/concerns/expectations  Goal: Collaborate with me, my family, and caregiver to identify my specific goals  Outcome: Progressing     Problem: Psychosocial Needs  Goal: Collaborate with me, my family, and caregiver to identify my specific goals  Outcome: Progressing        The clinical goals for the shift include pt would have no drainage from chest tube site this shift. Which has been  met.   Pt had uneventful day.

## 2024-02-02 NOTE — PROGRESS NOTES
"[facility] Hospitalist Progress Note        Santi Rivera is a 63 y.o. male on day 1 of admission presenting with Primary spontaneous pneumothorax.    Subjective   NAEON. Overall stable, does not complain of new acute issues.    Review of Systems   Constitutional:  Negative for fever.   Respiratory:  Negative for shortness of breath.    Cardiovascular:  Negative for chest pain.   Gastrointestinal:  Negative for abdominal distention, abdominal pain and vomiting.       Objective     Physical Exam  Constitutional:       General: He is not in acute distress.  Cardiovascular:      Rate and Rhythm: Normal rate and regular rhythm.   Pulmonary:      Effort: Pulmonary effort is normal.      Breath sounds: Normal breath sounds.      Comments: R sided chest tube  Abdominal:      General: There is no distension.      Palpations: Abdomen is soft.   Neurological:      Mental Status: He is alert. Mental status is at baseline.         Last Recorded Vitals  Blood pressure 95/55, pulse 67, temperature 36.5 °C (97.7 °F), resp. rate 18, height 1.753 m (5' 9.02\"), weight 50.6 kg (111 lb 8.8 oz), SpO2 99 %.  Intake/Output last 3 Shifts:  I/O last 3 completed shifts:  In: 1400 (27.7 mL/kg) [P.O.:1400]  Out: 450 (8.9 mL/kg) [Urine:450 (0.2 mL/kg/hr)]  Weight: 50.6 kg     Relevant Results  Results for orders placed or performed during the hospital encounter of 02/01/24 (from the past 24 hour(s))   CBC   Result Value Ref Range    WBC 12.9 (H) 4.4 - 11.3 x10*3/uL    nRBC 0.0 0.0 - 0.0 /100 WBCs    RBC 5.00 4.50 - 5.90 x10*6/uL    Hemoglobin 16.1 13.5 - 17.5 g/dL    Hematocrit 47.5 41.0 - 52.0 %    MCV 95 80 - 100 fL    MCH 32.2 26.0 - 34.0 pg    MCHC 33.9 32.0 - 36.0 g/dL    RDW 12.4 11.5 - 14.5 %    Platelets 239 150 - 450 x10*3/uL   Basic metabolic panel   Result Value Ref Range    Glucose 102 (H) 74 - 99 mg/dL    Sodium 136 136 - 145 mmol/L    Potassium 4.4 3.5 - 5.3 mmol/L    Chloride 99 98 - 107 mmol/L    Bicarbonate 27 21 - 32 mmol/L    " Anion Gap 14 10 - 20 mmol/L    Urea Nitrogen 17 6 - 23 mg/dL    Creatinine 0.97 0.50 - 1.30 mg/dL    eGFR 87 >60 mL/min/1.73m*2    Calcium 9.6 8.6 - 10.3 mg/dL   Troponin I, High Sensitivity   Result Value Ref Range    Troponin I, High Sensitivity 5 0 - 20 ng/L   CBC   Result Value Ref Range    WBC 13.7 (H) 4.4 - 11.3 x10*3/uL    nRBC 0.0 0.0 - 0.0 /100 WBCs    RBC 4.41 (L) 4.50 - 5.90 x10*6/uL    Hemoglobin 14.3 13.5 - 17.5 g/dL    Hematocrit 43.4 41.0 - 52.0 %    MCV 98 80 - 100 fL    MCH 32.4 26.0 - 34.0 pg    MCHC 32.9 32.0 - 36.0 g/dL    RDW 12.3 11.5 - 14.5 %    Platelets 127 (L) 150 - 450 x10*3/uL   Basic metabolic panel   Result Value Ref Range    Glucose 96 74 - 99 mg/dL    Sodium 134 (L) 136 - 145 mmol/L    Potassium 4.8 3.5 - 5.3 mmol/L    Chloride 102 98 - 107 mmol/L    Bicarbonate 24 21 - 32 mmol/L    Anion Gap 13 10 - 20 mmol/L    Urea Nitrogen 18 6 - 23 mg/dL    Creatinine 0.85 0.50 - 1.30 mg/dL    eGFR >90 >60 mL/min/1.73m*2    Calcium 8.7 8.6 - 10.3 mg/dL       Imaging Results  XR chest 1 view    Result Date: 2/2/2024  Interpreted By:  Brenda Salas, STUDY: XR CHEST 1 VIEW;  2/1/2024 9:33 pm   INDICATION: Signs/Symptoms:treated for pneumothorax at outside hospital, chest tube in place   COMPARISON: Chest x-ray 08/26/2022.   ACCESSION NUMBER(S): [id number]   ORDERING CLINICIAN: CONCHITA BURNS   TECHNIQUE: Portable upright frontal view of the chest was obtained .   FINDINGS: Monitoring wires are overlying the patient.   There is a right-sided chest tube with the tip at the right upper hemithorax.   The cardiomediastinal silhouette is within normal limits.   No focal consolidation, pleural effusion or pneumothorax. Redemonstration of hyperlucent lungs with flattening of the hemidiaphragms, suggestive of emphysema.       1. No radiographic evidence of acute cardiopulmonary process. Right-sided chest tube. 2. Emphysema.       MACRO: None.   Signed by: Brenda Salas 2/2/2024 1:16 AM Dictation  workstation:   [id number]       Medications:  budesonide, 0.5 mg, nebulization, BID  formoterol, 20 mcg, nebulization, BID  heparin (porcine), 5,000 Units, subcutaneous, q8h  ipratropium-albuteroL, 3 mL, nebulization, TID  melatonin, 3 mg, oral, Daily  pantoprazole, 40 mg, oral, Daily before breakfast   Or  pantoprazole, 40 mg, intravenous, Daily before breakfast  sennosides, 2 tablet, oral, BID  tiotropium, 2 Inhalation, inhalation, Daily       PRN medications: acetaminophen **OR** acetaminophen **OR** acetaminophen, albuterol, ondansetron ODT **OR** ondansetron, oxyCODONE, traMADol     Assessment/Plan     #Secondary spontaneous PTX in setting of COPD  #COPD not in exacerbation  - appreciate pulm  - chest tube to suction  - nebz    DVT Prophylaxis:  Heparin subq      Discharge Planning: Anticipate DC to home once medically ready    I personally examined the patient and reviewed chart, data, labs and radiology reports.  Plan of care was discussed with patient, all questions answered.    Total time spent: At least 38 minutes, providing counseling or in coordination of care. Total time on this day of visit includes record and documentation review before and after visit including documentation and time not explicitly included on EMR time stamp.    Aline Clinton MD  [facility] Medicine

## 2024-02-02 NOTE — H&P
History Of Present Illness  Santi Rivera is a 63 y.o. male presenting with chest tube in place for a large spontaneous pneumothorax and COPD exacerbation.  Patient was transferred from [facility] for higher level of care secondary to the chest tube and the spontaneous pneumothorax.  Patient states that yesterday evening he went to walk his dogs and was suddenly short of breath at rest.  Patient states when he tried to walk the dogs he was able to get down the driveway but that was it as he was very short of breath.  It was slightly better at rest but not significantly.  He could not sleep last night and became extremely short of breath around 2 AM when he tried to go to the bathroom.  Patient states along with shortness of breath he had tightness across his upper chest bilaterally.  He went by EMS to [facility] where they diagnosed him with a collapsed lung and the chest tube was placed with significant relief.  Patient states he has no dyspnea at rest currently.  He still has some right upper chest discomfort that he rates a 3 or 4 out of 10 at rest.  It is a sharper pain and he feels it is from the procedure to insert the chest tube.  Patient does have infrequent occasional nausea for the past 4 years but none recently and he has no other symptoms.  Again he currently has no dyspnea at rest.    In the emergency department at [facility] per the records he had a large spontaneous pneumothorax on the right per chest x-ray.  They put in a chest tube and the postprocedure chest x-ray showed no definite signs of any residual right pneumothorax identified.  That was about 6 AM this morning.  He had an EKG that showed sinus tach with a heart rate of 106 and no signs of ischemia.  His troponins were 13 at 4 AM and 12 at 6 AM with a delta troponin of 1 at 6 AM.  His BNP was 30 and his INR was 1.1 earlier this morning at [facility].  Patient was transferred on 4 L of O2 by nasal  cannula.  Patient's labs here revealed a basic metabolic panel within normal limits, CBC had a white count of 12.9 but the H&H and platelets were normal.  Troponin was normal at 5.    Past medical history: COPD, dyspnea on exertion, GERD, small bowel obstruction about 5 or 6 years ago.    Medications: Trelegy Ellipta inhaler, ProAir, albuterol by nebulizer    Allergies: Amoxicillin causes a rash    Social history: Patient smokes half pack of cigarettes a day, denies alcohol use.     History reviewed. No pertinent past medical history.  Past Surgical History:   Procedure Laterality Date    ABDOMINAL ADHESION SURGERY  12/04/2017    Laparoscopic Lysis Of Intestinal Adhesions    EYE SURGERY  12/07/2017    Eye Surgery    HERNIA REPAIR  12/07/2017    Hernia Repair     Social History     Tobacco Use    Smoking status: Every Day     Packs/day: 0.50     Years: 45.00     Additional pack years: 0.00     Total pack years: 22.50     Types: Cigarettes    Smokeless tobacco: Never   Substance Use Topics    Alcohol use: Not Currently        Family History  No family history on file.     Allergies  Amoxicillin    Review of Systems  Patient denies  vomiting, fever, chills, diarrhea, constipation,  vision changes, rashes, dysuria, paresthesias, vertigo, headache, cough or cold symptoms, or any other complaints at this time. A complete review of systems was done, and is as stated in the history of present illness, is otherwise negative or not pertinent to the complaint.    Physical Exam  Physical exam: Vital signs and nurses notes were reviewed.    General:  no acute distress. Alert and oriented  x 3.  Talks easily in full sentences.  Patient is very thin and likely underweight somewhat.    Head: atraumatic and normocephalic    Eyes: Pupils equal round reactive to light, EOMs are intact, conjunctivae is not injected.    Oropharynx: No erythema or exudate noted, no trismus or drooling, buccal mucosa is moist.    Ears:  normal external  exam, no swelling or erythema,     Nasal: normal external exam,     Neck: Supple, full range of motion,     Cardiac: Regular rate and rhythm. No murmurs noted.  Upper right chest wall is slightly tender to palpation.  Patient has a chest tube on the right lateral aspect.  No drainage in the tube, no blood.    Pulmonary: Lung sounds are diminished bilaterally but no adventitious breath sounds. No wheezes rales or rhonchi. No accessory muscle use no retraction noted.  Patient has oxygenation of 95% on 4 L of O2 by nasal cannula.    Abdomen: Soft,  Nontender. No guarding, rigidity, or distention. Normoactive bowel sounds. No pulsatile masses, no bruits.     Extremities:  Full range of motion.  No edema    Skin: No rash seen. Skin is warm and dry     Neuro: Patient is alert and oriented x3. Speech is clear. There is no asymmetry with facial grimaces, and no tongue deviation. Patient moves all extremities independently. Sensation is intact. No obvious neuro deficits are noted.     Last Recorded Vitals  BP 98/59 (BP Location: Left arm, Patient Position: Lying)   Pulse 70   Temp 36.7 °C (98.1 °F) (Oral)   Resp 18   Wt 50.6 kg (111 lb 8.8 oz)   SpO2 97%     Relevant Results  Scheduled medications  budesonide, 0.5 mg, nebulization, BID  formoterol, 20 mcg, nebulization, BID  heparin (porcine), 5,000 Units, subcutaneous, q8h  heparin (porcine), 5,000 Units, subcutaneous, Once  [START ON 2/2/2024] ipratropium-albuteroL, 3 mL, nebulization, TID  melatonin, 3 mg, oral, Daily  [START ON 2/2/2024] pantoprazole, 40 mg, oral, Daily before breakfast   Or  [START ON 2/2/2024] pantoprazole, 40 mg, intravenous, Daily before breakfast  sennosides, 2 tablet, oral, BID  tiotropium, 2 Inhalation, inhalation, Daily      Continuous medications  oxygen, , Last Rate: 3 L/min (02/01/24 1815)      PRN medications  PRN medications: acetaminophen **OR** acetaminophen **OR** acetaminophen, albuterol, ondansetron ODT **OR** ondansetron,  oxyCODONE, traMADol    Results for orders placed or performed during the hospital encounter of 02/01/24 (from the past 24 hour(s))   CBC   Result Value Ref Range    WBC 12.9 (H) 4.4 - 11.3 x10*3/uL    nRBC 0.0 0.0 - 0.0 /100 WBCs    RBC 5.00 4.50 - 5.90 x10*6/uL    Hemoglobin 16.1 13.5 - 17.5 g/dL    Hematocrit 47.5 41.0 - 52.0 %    MCV 95 80 - 100 fL    MCH 32.2 26.0 - 34.0 pg    MCHC 33.9 32.0 - 36.0 g/dL    RDW 12.4 11.5 - 14.5 %    Platelets 239 150 - 450 x10*3/uL   Basic metabolic panel   Result Value Ref Range    Glucose 102 (H) 74 - 99 mg/dL    Sodium 136 136 - 145 mmol/L    Potassium 4.4 3.5 - 5.3 mmol/L    Chloride 99 98 - 107 mmol/L    Bicarbonate 27 21 - 32 mmol/L    Anion Gap 14 10 - 20 mmol/L    Urea Nitrogen 17 6 - 23 mg/dL    Creatinine 0.97 0.50 - 1.30 mg/dL    eGFR 87 >60 mL/min/1.73m*2    Calcium 9.6 8.6 - 10.3 mg/dL   Troponin I, High Sensitivity   Result Value Ref Range    Troponin I, High Sensitivity 5 0 - 20 ng/L     XR chest 1 view    (Results Pending)          Assessment/Plan   Principal Problem:    Primary spontaneous pneumothorax  Active Problems:    Chronic obstructive pulmonary disease with acute exacerbation (CMS/HCC)      Plan: Pulmonology consult for management of the chest tube, the spontaneous pneumothorax, and the COPD etc.  DuoNebs every 6 hours as needed  Albuterol as needed  Telemetry  Oxygen by nasal cannula  Repeat labs in the morning.  Tramadol for moderate pain and oxycodone for severe pain  Spiriva and Breo are substituted for his Trelegy  Heparin prophylactically  Chest x-ray is pending  Repeat EKG pending  All findings, orders, plan, discussed with Dr. Lasha Delacruz PA-C

## 2024-02-03 ENCOUNTER — APPOINTMENT (OUTPATIENT)
Dept: RADIOLOGY | Facility: HOSPITAL | Age: 64
DRG: 190 | End: 2024-02-03
Payer: COMMERCIAL

## 2024-02-03 LAB
ANION GAP SERPL CALC-SCNC: 9 MMOL/L (ref 10–20)
BUN SERPL-MCNC: 18 MG/DL (ref 6–23)
CALCIUM SERPL-MCNC: 8.9 MG/DL (ref 8.6–10.3)
CHLORIDE SERPL-SCNC: 100 MMOL/L (ref 98–107)
CO2 SERPL-SCNC: 30 MMOL/L (ref 21–32)
CREAT SERPL-MCNC: 0.81 MG/DL (ref 0.5–1.3)
EGFRCR SERPLBLD CKD-EPI 2021: >90 ML/MIN/1.73M*2
ERYTHROCYTE [DISTWIDTH] IN BLOOD BY AUTOMATED COUNT: 12.6 % (ref 11.5–14.5)
GLUCOSE SERPL-MCNC: 90 MG/DL (ref 74–99)
HCT VFR BLD AUTO: 43.5 % (ref 41–52)
HGB BLD-MCNC: 14.8 G/DL (ref 13.5–17.5)
HOLD SPECIMEN: NORMAL
MCH RBC QN AUTO: 32.2 PG (ref 26–34)
MCHC RBC AUTO-ENTMCNC: 34 G/DL (ref 32–36)
MCV RBC AUTO: 95 FL (ref 80–100)
NRBC BLD-RTO: 0 /100 WBCS (ref 0–0)
PLATELET # BLD AUTO: 195 X10*3/UL (ref 150–450)
POTASSIUM SERPL-SCNC: 4.1 MMOL/L (ref 3.5–5.3)
RBC # BLD AUTO: 4.59 X10*6/UL (ref 4.5–5.9)
SODIUM SERPL-SCNC: 135 MMOL/L (ref 136–145)
WBC # BLD AUTO: 13.1 X10*3/UL (ref 4.4–11.3)

## 2024-02-03 PROCEDURE — 36415 COLL VENOUS BLD VENIPUNCTURE: CPT | Performed by: PHYSICIAN ASSISTANT

## 2024-02-03 PROCEDURE — 94640 AIRWAY INHALATION TREATMENT: CPT

## 2024-02-03 PROCEDURE — 2500000002 HC RX 250 W HCPCS SELF ADMINISTERED DRUGS (ALT 637 FOR MEDICARE OP, ALT 636 FOR OP/ED): Performed by: INTERNAL MEDICINE

## 2024-02-03 PROCEDURE — 71045 X-RAY EXAM CHEST 1 VIEW: CPT | Performed by: STUDENT IN AN ORGANIZED HEALTH CARE EDUCATION/TRAINING PROGRAM

## 2024-02-03 PROCEDURE — 94667 MNPJ CHEST WALL 1ST: CPT

## 2024-02-03 PROCEDURE — 71045 X-RAY EXAM CHEST 1 VIEW: CPT

## 2024-02-03 PROCEDURE — 2500000002 HC RX 250 W HCPCS SELF ADMINISTERED DRUGS (ALT 637 FOR MEDICARE OP, ALT 636 FOR OP/ED): Performed by: PHARMACIST

## 2024-02-03 PROCEDURE — 2500000001 HC RX 250 WO HCPCS SELF ADMINISTERED DRUGS (ALT 637 FOR MEDICARE OP): Performed by: PHYSICIAN ASSISTANT

## 2024-02-03 PROCEDURE — 99233 SBSQ HOSP IP/OBS HIGH 50: CPT | Performed by: INTERNAL MEDICINE

## 2024-02-03 PROCEDURE — 85027 COMPLETE CBC AUTOMATED: CPT | Performed by: PHYSICIAN ASSISTANT

## 2024-02-03 PROCEDURE — 2500000004 HC RX 250 GENERAL PHARMACY W/ HCPCS (ALT 636 FOR OP/ED): Performed by: HOSPITALIST

## 2024-02-03 PROCEDURE — 1200000002 HC GENERAL ROOM WITH TELEMETRY DAILY

## 2024-02-03 PROCEDURE — 99232 SBSQ HOSP IP/OBS MODERATE 35: CPT | Performed by: STUDENT IN AN ORGANIZED HEALTH CARE EDUCATION/TRAINING PROGRAM

## 2024-02-03 PROCEDURE — 80048 BASIC METABOLIC PNL TOTAL CA: CPT | Performed by: PHYSICIAN ASSISTANT

## 2024-02-03 RX ORDER — GUAIFENESIN 600 MG/1
600 TABLET, EXTENDED RELEASE ORAL 2 TIMES DAILY PRN
Status: DISCONTINUED | OUTPATIENT
Start: 2024-02-03 | End: 2024-02-04

## 2024-02-03 RX ADMIN — GUAIFENESIN 600 MG: 600 TABLET ORAL at 21:41

## 2024-02-03 RX ADMIN — IPRATROPIUM BROMIDE AND ALBUTEROL SULFATE 3 ML: 2.5; .5 SOLUTION RESPIRATORY (INHALATION) at 14:30

## 2024-02-03 RX ADMIN — IPRATROPIUM BROMIDE AND ALBUTEROL SULFATE 3 ML: 2.5; .5 SOLUTION RESPIRATORY (INHALATION) at 07:22

## 2024-02-03 RX ADMIN — IPRATROPIUM BROMIDE AND ALBUTEROL SULFATE 3 ML: 2.5; .5 SOLUTION RESPIRATORY (INHALATION) at 20:19

## 2024-02-03 RX ADMIN — ACETAMINOPHEN 650 MG: 325 TABLET ORAL at 00:58

## 2024-02-03 RX ADMIN — TIOTROPIUM BROMIDE INHALATION SPRAY 2 PUFF: 3.12 SPRAY, METERED RESPIRATORY (INHALATION) at 07:23

## 2024-02-03 RX ADMIN — Medication 3 MG: at 21:41

## 2024-02-03 RX ADMIN — FORMOTEROL FUMARATE DIHYDRATE 20 MCG: 20 SOLUTION RESPIRATORY (INHALATION) at 20:18

## 2024-02-03 RX ADMIN — BUDESONIDE 0.5 MG: 0.5 INHALANT RESPIRATORY (INHALATION) at 07:22

## 2024-02-03 RX ADMIN — FORMOTEROL FUMARATE DIHYDRATE 20 MCG: 20 SOLUTION RESPIRATORY (INHALATION) at 07:22

## 2024-02-03 RX ADMIN — BUDESONIDE 0.5 MG: 0.5 INHALANT RESPIRATORY (INHALATION) at 20:19

## 2024-02-03 ASSESSMENT — COGNITIVE AND FUNCTIONAL STATUS - GENERAL
MOBILITY SCORE: 24
MOBILITY SCORE: 24
DAILY ACTIVITIY SCORE: 24
DAILY ACTIVITIY SCORE: 24

## 2024-02-03 ASSESSMENT — PAIN - FUNCTIONAL ASSESSMENT
PAIN_FUNCTIONAL_ASSESSMENT: 0-10

## 2024-02-03 ASSESSMENT — PAIN SCALES - GENERAL
PAINLEVEL_OUTOF10: 0 - NO PAIN

## 2024-02-03 ASSESSMENT — ENCOUNTER SYMPTOMS
SHORTNESS OF BREATH: 0
ABDOMINAL PAIN: 0
VOMITING: 0
FEVER: 0
ABDOMINAL DISTENTION: 0

## 2024-02-03 NOTE — PROGRESS NOTES
"[facility] Hospitalist Progress Note        Santi Rivera is a 63 y.o. male on day 2 of admission presenting with Primary spontaneous pneumothorax.    Subjective   NAEON. Overall stable, does not complain of new acute issues.    Review of Systems   Constitutional:  Negative for fever.   Respiratory:  Negative for shortness of breath.    Cardiovascular:  Negative for chest pain.   Gastrointestinal:  Negative for abdominal distention, abdominal pain and vomiting.       Objective     Physical Exam  Constitutional:       General: He is not in acute distress.  Cardiovascular:      Rate and Rhythm: Normal rate and regular rhythm.   Pulmonary:      Effort: Pulmonary effort is normal.      Breath sounds: Normal breath sounds.      Comments: R sided chest tube  Abdominal:      General: There is no distension.      Palpations: Abdomen is soft.   Neurological:      Mental Status: He is alert. Mental status is at baseline.         Last Recorded Vitals  Blood pressure 111/71, pulse 87, temperature 36.7 °C (98.1 °F), temperature source Temporal, resp. rate 20, height 1.753 m (5' 9.02\"), weight 50.6 kg (111 lb 8.8 oz), SpO2 95 %.  Intake/Output last 3 Shifts:  I/O last 3 completed shifts:  In: 2330 (46 mL/kg) [P.O.:2330]  Out: 2150 (42.5 mL/kg) [Urine:2150 (1.2 mL/kg/hr)]  Weight: 50.6 kg     Relevant Results  Results for orders placed or performed during the hospital encounter of 02/01/24 (from the past 24 hour(s))   CBC   Result Value Ref Range    WBC 13.1 (H) 4.4 - 11.3 x10*3/uL    nRBC 0.0 0.0 - 0.0 /100 WBCs    RBC 4.59 4.50 - 5.90 x10*6/uL    Hemoglobin 14.8 13.5 - 17.5 g/dL    Hematocrit 43.5 41.0 - 52.0 %    MCV 95 80 - 100 fL    MCH 32.2 26.0 - 34.0 pg    MCHC 34.0 32.0 - 36.0 g/dL    RDW 12.6 11.5 - 14.5 %    Platelets 195 150 - 450 x10*3/uL   Basic metabolic panel   Result Value Ref Range    Glucose 90 74 - 99 mg/dL    Sodium 135 (L) 136 - 145 mmol/L    Potassium 4.1 3.5 - 5.3 mmol/L    Chloride 100 98 - 107 mmol/L    " Bicarbonate 30 21 - 32 mmol/L    Anion Gap 9 (L) 10 - 20 mmol/L    Urea Nitrogen 18 6 - 23 mg/dL    Creatinine 0.81 0.50 - 1.30 mg/dL    eGFR >90 >60 mL/min/1.73m*2    Calcium 8.9 8.6 - 10.3 mg/dL   SST TOP   Result Value Ref Range    Extra Tube Hold for add-ons.        Imaging Results  XR chest 1 view    Result Date: 2/2/2024  Interpreted By:  Brenda Salas, STUDY: XR CHEST 1 VIEW;  2/1/2024 9:33 pm   INDICATION: Signs/Symptoms:treated for pneumothorax at outside hospital, chest tube in place   COMPARISON: Chest x-ray 08/26/2022.   ACCESSION NUMBER(S): [id number]   ORDERING CLINICIAN: CONCHITA BURNS   TECHNIQUE: Portable upright frontal view of the chest was obtained .   FINDINGS: Monitoring wires are overlying the patient.   There is a right-sided chest tube with the tip at the right upper hemithorax.   The cardiomediastinal silhouette is within normal limits.   No focal consolidation, pleural effusion or pneumothorax. Redemonstration of hyperlucent lungs with flattening of the hemidiaphragms, suggestive of emphysema.       1. No radiographic evidence of acute cardiopulmonary process. Right-sided chest tube. 2. Emphysema.       MACRO: None.   Signed by: Brenda Salas 2/2/2024 1:16 AM Dictation workstation:   [id number]       Medications:  budesonide, 0.5 mg, nebulization, BID  formoterol, 20 mcg, nebulization, BID  heparin (porcine), 5,000 Units, subcutaneous, q8h  ipratropium-albuteroL, 3 mL, nebulization, TID  melatonin, 3 mg, oral, Daily  pantoprazole, 40 mg, oral, Daily before breakfast   Or  pantoprazole, 40 mg, intravenous, Daily before breakfast  sennosides, 2 tablet, oral, BID  tiotropium, 2 Inhalation, inhalation, Daily       PRN medications: acetaminophen **OR** acetaminophen **OR** acetaminophen, albuterol, ondansetron ODT **OR** ondansetron, oxyCODONE, traMADol     Assessment/Plan     #Secondary spontaneous PTX in setting of COPD  #COPD not in exacerbation  - appreciate pulm  - chest tube to  suction  - nebz    DVT Prophylaxis:  Heparin subq      Discharge Planning: Anticipate DC to home once medically ready    I personally examined the patient and reviewed chart, data, labs and radiology reports.  Plan of care was discussed with patient, all questions answered.    Total time spent: At least 38 minutes, providing counseling or in coordination of care. Total time on this day of visit includes record and documentation review before and after visit including documentation and time not explicitly included on EMR time stamp.    Aline Clinton MD  [facility]

## 2024-02-03 NOTE — PROGRESS NOTES
Santi Rivera is a 63 y.o. male on day 2 of admission presenting with Primary spontaneous pneumothorax.    Patient with h/o COPD, GERD, SBO, who p/w acute onset SOB x 1 which started while walking his dogs to [facility] ED. SOB both at rest and with activity, associated with chest tightness, and severe to the point it would prevent him from sleeping. In the ED work up revealed R sided spontaneous pneumothorax, subsequently R sided CT was placed. Given need for CT management, he was placed on 4L NC and transferred to [facility] [place] for further management. Pulmonary is consulted for COPD and chest tube management.    Subjective   No acute overnight events. Continues to remain on supplemental 3L O2. CT placed to water seal, repeat chest CT no recurrence.      Cough slightly worse today, productive of yellow sputum. SOB and wheezing stable, still some chest pain with deep breathing.   Objective   Scheduled medications  budesonide, 0.5 mg, nebulization, BID  formoterol, 20 mcg, nebulization, BID  heparin (porcine), 5,000 Units, subcutaneous, q8h  ipratropium-albuteroL, 3 mL, nebulization, TID  melatonin, 3 mg, oral, Daily  pantoprazole, 40 mg, oral, Daily before breakfast   Or  pantoprazole, 40 mg, intravenous, Daily before breakfast  sennosides, 2 tablet, oral, BID  tiotropium, 2 Inhalation, inhalation, Daily    Continuous medications  oxygen, , Last Rate: 3 L/min (02/03/24 0722)    PRN medications  PRN medications: acetaminophen **OR** acetaminophen **OR** acetaminophen, albuterol, ondansetron ODT **OR** ondansetron, oxyCODONE, traMADol   Physical Exam  Constitutional:       General: He is not in acute distress.     Appearance: Normal appearance. He is not ill-appearing or toxic-appearing.      Comments: Thin.   HENT:      Head: Normocephalic and atraumatic.      Nose:      Comments: On 3L NC     Mouth/Throat:      Mouth: Mucous membranes are moist.      Comments: Mallampati 3-4, poor oral hygiene  Eyes:      General: No scleral  "icterus.     Extraocular Movements: Extraocular movements intact.      Pupils: Pupils are equal, round, and reactive to light.   Cardiovascular:      Rate and Rhythm: Normal rate and regular rhythm.      Heart sounds: No murmur heard.     No friction rub. No gallop.   Pulmonary:      Effort: Pulmonary effort is normal. No respiratory distress.      Breath sounds: Rhonchi present. No wheezing or rales.      Comments: R CT in place, without air leak. Poor air entry with bibasilar rhonchi  Abdominal:      General: There is no distension.      Palpations: Abdomen is soft.      Tenderness: There is no abdominal tenderness.   Musculoskeletal:         General: No tenderness. Normal range of motion.      Cervical back: Neck supple. No rigidity or tenderness.      Right lower leg: No edema.      Left lower leg: No edema.   Lymphadenopathy:      Cervical: No cervical adenopathy.   Skin:     General: Skin is warm and dry.      Coloration: Skin is not jaundiced.      Findings: No bruising.   Neurological:      General: No focal deficit present.      Mental Status: He is alert and oriented to person, place, and time.      Cranial Nerves: No cranial nerve deficit.      Motor: No weakness.   Psychiatric:         Mood and Affect: Mood normal.         Behavior: Behavior normal.     Last Recorded Vitals  Blood pressure 100/58, pulse 87, temperature 37.6 °C (99.6 °F), temperature source Oral, resp. rate 18, height 1.753 m (5' 9.02\"), weight 50.6 kg (111 lb 8.8 oz), SpO2 96 %.  Intake/Output last 3 Shifts:  I/O last 3 completed shifts:  In: 2330 (46 mL/kg) [P.O.:2330]  Out: 2150 (42.5 mL/kg) [Urine:2150 (1.2 mL/kg/hr)]  Weight: 50.6 kg   Relevant Results  Results for orders placed or performed during the hospital encounter of 02/01/24 (from the past 24 hour(s))   CBC   Result Value Ref Range    WBC 13.1 (H) 4.4 - 11.3 x10*3/uL    nRBC 0.0 0.0 - 0.0 /100 WBCs    RBC 4.59 4.50 - 5.90 x10*6/uL    Hemoglobin 14.8 13.5 - 17.5 g/dL    " Hematocrit 43.5 41.0 - 52.0 %    MCV 95 80 - 100 fL    MCH 32.2 26.0 - 34.0 pg    MCHC 34.0 32.0 - 36.0 g/dL    RDW 12.6 11.5 - 14.5 %    Platelets 195 150 - 450 x10*3/uL   Basic metabolic panel   Result Value Ref Range    Glucose 90 74 - 99 mg/dL    Sodium 135 (L) 136 - 145 mmol/L    Potassium 4.1 3.5 - 5.3 mmol/L    Chloride 100 98 - 107 mmol/L    Bicarbonate 30 21 - 32 mmol/L    Anion Gap 9 (L) 10 - 20 mmol/L    Urea Nitrogen 18 6 - 23 mg/dL    Creatinine 0.81 0.50 - 1.30 mg/dL    eGFR >90 >60 mL/min/1.73m*2    Calcium 8.9 8.6 - 10.3 mg/dL   SST TOP   Result Value Ref Range    Extra Tube Hold for add-ons.    XR chest 1 view    Result Date: 2/2/2024  Interpreted By:  Brenda Salas, STUDY: XR CHEST 1 VIEW;  2/1/2024 9:33 pm   INDICATION: Signs/Symptoms:treated for pneumothorax at outside hospital, chest tube in place   COMPARISON: Chest x-ray 08/26/2022.   ACCESSION NUMBER(S): [id number]   ORDERING CLINICIAN: CONCHITA BURNS   TECHNIQUE: Portable upright frontal view of the chest was obtained .   FINDINGS: Monitoring wires are overlying the patient.   There is a right-sided chest tube with the tip at the right upper hemithorax.   The cardiomediastinal silhouette is within normal limits.   No focal consolidation, pleural effusion or pneumothorax. Redemonstration of hyperlucent lungs with flattening of the hemidiaphragms, suggestive of emphysema.       1. No radiographic evidence of acute cardiopulmonary process. Right-sided chest tube. 2. Emphysema.       MACRO: None.   Signed by: Brenda Salas 2/2/2024 1:16 AM Dictation workstation:   [id number]    Assessment/Plan   Principal Problem:    Primary spontaneous pneumothorax  Active Problems:    Chronic obstructive pulmonary disease with acute exacerbation (CMS/HCC)  63 YOM with h/o COPD, GERD, SBO, who p/w acute onset SOB x 1 which started while walking his dogs to [facility] ED. SOB both at rest and with activity, associated with chest tightness, and severe to the  point it would prevent him from sleeping. In the ED work up revealed R sided spontaneous pneumothorax, subsequently R sided CT was placed. Given need for CT management, he was placed on 4L NC and transferred [place] [facility] [place]. Pulmonary is consulted for COPD and chest tube management.      Pneumothorax: spontaneous, first episode, likely due to a bullae, s/p R sided CT placement with near complete resolution      CT placed to water seal 2/3, CXR no recurrence, now clamped      CXR at 10 pm tonight and tomorrow morning, if no recurrence, will remove      Might need further work up including chest CT      Pain control     COPD: per notes, sees Dr. Johnson as outpatient, currently on Trelegy and albuterol at home, infrequent use of albuterol. No PFT in our EMR. CT report from 2023 read as severe emphysema      Continue Budesonide, formoterol and Spiriva      Duo-Neb      Would DC on home inhalers on DC      FU with private pulmonologist after DC     Respiratory failure: acute with hypoxic due to above. Needs slowly improving.       Continue supplemental O2, wean off as tolerates      Home O2 evaluation before DC     Smoking: active smoker      Smoking cessation counseling      Nicotine patch if needed     DVT prophylaxis: with subcutaneous heparin     Pulmonary will FU while in house.   Yasmine Richard MD

## 2024-02-03 NOTE — CARE PLAN
The patient's goals for the shift include  no sob    The clinical goals for the shift include pt will remain comfortable      Problem: Pain  Goal: My pain/discomfort is manageable  Outcome: Met     Problem: Safety  Goal: Patient will be injury free during hospitalization  Outcome: Progressing     Problem: Daily Care  Goal: Daily care needs are met  2/3/2024 1746 by Ramiro Ware RN  Outcome: Progressing     Problem: Discharge Barriers  Goal: My discharge needs are met  Outcome: Progressing     Problem: Respiratory  Goal: Verbalize decreased shortness of breath this shift  Outcome: Progressing

## 2024-02-03 NOTE — CARE PLAN
The patient's goals for the shift include      The clinical goals for the shift include pt will remain comfortable    Problem: Pain  Goal: My pain/discomfort is manageable  Outcome: Progressing     Problem: Safety  Goal: Patient will be injury free during hospitalization  Outcome: Progressing  Goal: I will remain free of falls  Outcome: Progressing     Problem: Daily Care  Goal: Daily care needs are met  Outcome: Progressing     Problem: Psychosocial Needs  Goal: Demonstrates ability to cope with hospitalization/illness  Outcome: Progressing  Goal: Collaborate with me, my family, and caregiver to identify my specific goals  Outcome: Progressing     Problem: Discharge Barriers  Goal: My discharge needs are met  Outcome: Progressing

## 2024-02-04 ENCOUNTER — APPOINTMENT (OUTPATIENT)
Dept: RADIOLOGY | Facility: HOSPITAL | Age: 64
DRG: 190 | End: 2024-02-04
Payer: COMMERCIAL

## 2024-02-04 PROCEDURE — 71045 X-RAY EXAM CHEST 1 VIEW: CPT | Performed by: RADIOLOGY

## 2024-02-04 PROCEDURE — 71045 X-RAY EXAM CHEST 1 VIEW: CPT | Performed by: STUDENT IN AN ORGANIZED HEALTH CARE EDUCATION/TRAINING PROGRAM

## 2024-02-04 PROCEDURE — 71045 X-RAY EXAM CHEST 1 VIEW: CPT

## 2024-02-04 PROCEDURE — 1100000001 HC PRIVATE ROOM DAILY

## 2024-02-04 PROCEDURE — 2500000002 HC RX 250 W HCPCS SELF ADMINISTERED DRUGS (ALT 637 FOR MEDICARE OP, ALT 636 FOR OP/ED): Performed by: STUDENT IN AN ORGANIZED HEALTH CARE EDUCATION/TRAINING PROGRAM

## 2024-02-04 PROCEDURE — 94640 AIRWAY INHALATION TREATMENT: CPT

## 2024-02-04 PROCEDURE — 99232 SBSQ HOSP IP/OBS MODERATE 35: CPT | Performed by: STUDENT IN AN ORGANIZED HEALTH CARE EDUCATION/TRAINING PROGRAM

## 2024-02-04 PROCEDURE — 94668 MNPJ CHEST WALL SBSQ: CPT

## 2024-02-04 PROCEDURE — 2500000001 HC RX 250 WO HCPCS SELF ADMINISTERED DRUGS (ALT 637 FOR MEDICARE OP): Performed by: PHYSICIAN ASSISTANT

## 2024-02-04 PROCEDURE — 2500000002 HC RX 250 W HCPCS SELF ADMINISTERED DRUGS (ALT 637 FOR MEDICARE OP, ALT 636 FOR OP/ED): Performed by: INTERNAL MEDICINE

## 2024-02-04 PROCEDURE — 99233 SBSQ HOSP IP/OBS HIGH 50: CPT | Performed by: INTERNAL MEDICINE

## 2024-02-04 PROCEDURE — 2500000002 HC RX 250 W HCPCS SELF ADMINISTERED DRUGS (ALT 637 FOR MEDICARE OP, ALT 636 FOR OP/ED): Performed by: PHARMACIST

## 2024-02-04 RX ORDER — ENOXAPARIN SODIUM 100 MG/ML
40 INJECTION SUBCUTANEOUS DAILY
Status: DISCONTINUED | OUTPATIENT
Start: 2024-02-04 | End: 2024-02-05 | Stop reason: HOSPADM

## 2024-02-04 RX ORDER — BUDESONIDE 0.5 MG/2ML
0.5 INHALANT ORAL
Status: DISCONTINUED | OUTPATIENT
Start: 2024-02-04 | End: 2024-02-05 | Stop reason: HOSPADM

## 2024-02-04 RX ORDER — IPRATROPIUM BROMIDE AND ALBUTEROL SULFATE 2.5; .5 MG/3ML; MG/3ML
3 SOLUTION RESPIRATORY (INHALATION) EVERY 6 HOURS PRN
Status: DISCONTINUED | OUTPATIENT
Start: 2024-02-04 | End: 2024-02-04

## 2024-02-04 RX ORDER — FLUTICASONE FUROATE AND VILANTEROL 100; 25 UG/1; UG/1
1 POWDER RESPIRATORY (INHALATION)
Status: DISCONTINUED | OUTPATIENT
Start: 2024-02-04 | End: 2024-02-04

## 2024-02-04 RX ORDER — FORMOTEROL FUMARATE DIHYDRATE 20 UG/2ML
20 SOLUTION RESPIRATORY (INHALATION)
Status: DISCONTINUED | OUTPATIENT
Start: 2024-02-04 | End: 2024-02-05 | Stop reason: HOSPADM

## 2024-02-04 RX ORDER — IPRATROPIUM BROMIDE AND ALBUTEROL SULFATE 2.5; .5 MG/3ML; MG/3ML
3 SOLUTION RESPIRATORY (INHALATION)
Status: DISCONTINUED | OUTPATIENT
Start: 2024-02-04 | End: 2024-02-05 | Stop reason: HOSPADM

## 2024-02-04 RX ADMIN — BUDESONIDE 0.5 MG: 0.5 INHALANT RESPIRATORY (INHALATION) at 19:42

## 2024-02-04 RX ADMIN — IPRATROPIUM BROMIDE AND ALBUTEROL SULFATE 3 ML: 2.5; .5 SOLUTION RESPIRATORY (INHALATION) at 19:42

## 2024-02-04 RX ADMIN — FORMOTEROL FUMARATE DIHYDRATE 20 MCG: 20 SOLUTION RESPIRATORY (INHALATION) at 07:41

## 2024-02-04 RX ADMIN — BUDESONIDE 0.5 MG: 0.5 INHALANT RESPIRATORY (INHALATION) at 07:41

## 2024-02-04 RX ADMIN — SENNOSIDES 17.2 MG: 8.6 TABLET, FILM COATED ORAL at 12:43

## 2024-02-04 RX ADMIN — IPRATROPIUM BROMIDE AND ALBUTEROL SULFATE 3 ML: 2.5; .5 SOLUTION RESPIRATORY (INHALATION) at 14:18

## 2024-02-04 RX ADMIN — TIOTROPIUM BROMIDE INHALATION SPRAY 2 PUFF: 3.12 SPRAY, METERED RESPIRATORY (INHALATION) at 07:44

## 2024-02-04 RX ADMIN — FORMOTEROL FUMARATE DIHYDRATE 20 MCG: 20 SOLUTION RESPIRATORY (INHALATION) at 19:42

## 2024-02-04 RX ADMIN — ACETAMINOPHEN 650 MG: 325 TABLET ORAL at 17:57

## 2024-02-04 RX ADMIN — IPRATROPIUM BROMIDE AND ALBUTEROL SULFATE 3 ML: 2.5; .5 SOLUTION RESPIRATORY (INHALATION) at 07:41

## 2024-02-04 ASSESSMENT — PAIN SCALES - GENERAL
PAINLEVEL_OUTOF10: 1
PAINLEVEL_OUTOF10: 0 - NO PAIN
PAINLEVEL_OUTOF10: 5 - MODERATE PAIN

## 2024-02-04 ASSESSMENT — COGNITIVE AND FUNCTIONAL STATUS - GENERAL
DAILY ACTIVITIY SCORE: 24
MOBILITY SCORE: 24
MOBILITY SCORE: 24
DAILY ACTIVITIY SCORE: 24

## 2024-02-04 ASSESSMENT — ENCOUNTER SYMPTOMS
FEVER: 0
ABDOMINAL PAIN: 0
VOMITING: 0
ABDOMINAL DISTENTION: 0
SHORTNESS OF BREATH: 0

## 2024-02-04 ASSESSMENT — PAIN - FUNCTIONAL ASSESSMENT
PAIN_FUNCTIONAL_ASSESSMENT: 0-10

## 2024-02-04 ASSESSMENT — ACTIVITIES OF DAILY LIVING (ADL): LACK_OF_TRANSPORTATION: NO

## 2024-02-04 NOTE — PROGRESS NOTES
"[facility] Hospitalist Progress Note        Santi Rivera is a 63 y.o. male on day 3 of admission presenting with Primary spontaneous pneumothorax.    Subjective   NAEON. Overall stable, does not complain of new acute issues.    Review of Systems   Constitutional:  Negative for fever.   Respiratory:  Negative for shortness of breath.    Cardiovascular:  Negative for chest pain.   Gastrointestinal:  Negative for abdominal distention, abdominal pain and vomiting.       Objective     Physical Exam  Constitutional:       General: He is not in acute distress.  Cardiovascular:      Rate and Rhythm: Normal rate and regular rhythm.   Pulmonary:      Effort: Pulmonary effort is normal.      Breath sounds: Normal breath sounds.      Comments: R sided chest tube  Abdominal:      General: There is no distension.      Palpations: Abdomen is soft.   Neurological:      Mental Status: He is alert. Mental status is at baseline.         Last Recorded Vitals  Blood pressure 95/52, pulse 87, temperature 36.9 °C (98.4 °F), temperature source Temporal, resp. rate 16, height 1.753 m (5' 9.02\"), weight 50.6 kg (111 lb 8.8 oz), SpO2 91 %.  Intake/Output last 3 Shifts:  I/O last 3 completed shifts:  In: 1780 (35.2 mL/kg) [P.O.:1780]  Out: 2295 (45.4 mL/kg) [Urine:2295 (1.3 mL/kg/hr)]  Weight: 50.6 kg     Relevant Results  No results found for this or any previous visit (from the past 24 hour(s)).      Imaging Results  XR chest 1 view    Result Date: 2/2/2024  Interpreted By:  Brenda Salas, STUDY: XR CHEST 1 VIEW;  2/1/2024 9:33 pm   INDICATION: Signs/Symptoms:treated for pneumothorax at outside hospital, chest tube in place   COMPARISON: Chest x-ray 08/26/2022.   ACCESSION NUMBER(S): [id number]   ORDERING CLINICIAN: CONCHITA BURNS   TECHNIQUE: Portable upright frontal view of the chest was obtained .   FINDINGS: Monitoring wires are overlying the patient.   There is a right-sided chest tube with the tip at the right upper hemithorax.   The " cardiomediastinal silhouette is within normal limits.   No focal consolidation, pleural effusion or pneumothorax. Redemonstration of hyperlucent lungs with flattening of the hemidiaphragms, suggestive of emphysema.       1. No radiographic evidence of acute cardiopulmonary process. Right-sided chest tube. 2. Emphysema.       MACRO: None.   Signed by: Brenda Salas 2/2/2024 1:16 AM Dictation workstation:   [id number]       Medications:  budesonide, 0.5 mg, nebulization, BID  enoxaparin, 40 mg, subcutaneous, Daily  formoterol, 20 mcg, nebulization, BID  melatonin, 3 mg, oral, Daily  sennosides, 2 tablet, oral, BID  [START ON 2/5/2024] tiotropium, 2 Inhalation, inhalation, Daily       PRN medications: acetaminophen **OR** acetaminophen **OR** acetaminophen, ipratropium-albuteroL, ondansetron ODT **OR** ondansetron, oxyCODONE     Assessment/Plan     #Secondary spontaneous PTX in setting of COPD  #COPD not in exacerbation  - appreciate pulm  - chest tube clamped (?possible removal today)  - nebz    DVT Prophylaxis:  Heparin subq      Discharge Planning: Anticipate DC to home once medically ready    I personally examined the patient and reviewed chart, data, labs and radiology reports.  Plan of care was discussed with patient, all questions answered.    Total time spent: At least 38 minutes, providing counseling or in coordination of care. Total time on this day of visit includes record and documentation review before and after visit including documentation and time not explicitly included on EMR time stamp.    Aline Clinton MD  [facility]

## 2024-02-04 NOTE — PROGRESS NOTES
Santi Rivera is a 63 y.o. male on day 3 of admission presenting with Primary spontaneous pneumothorax.    Patient with h/o COPD, GERD, SBO, who p/w acute onset SOB x 1 which started while walking his dogs to [facility] ED. SOB both at rest and with activity, associated with chest tightness, and severe to the point it would prevent him from sleeping. In the ED work up revealed R sided spontaneous pneumothorax, subsequently R sided CT was placed. Given need for CT management, he was placed on 4L NC and transferred to [facility] [place] further management. Pulmonary is consulted for COPD and chest tube management.    Subjective   No acute overnight events. O2 requirements improved, now on 2L supplemental O2. CT kept clamped last night. Chest X-ray this am, no recurrence of his pneumothorax.      Continues to have a cough productive of yellow sputum, but improved from yesterday. SOB and wheezing stable, still some chest pain with deep breathing at the chest tube insertion site.   Objective   Scheduled medications  budesonide, 0.5 mg, nebulization, BID  enoxaparin, 40 mg, subcutaneous, Daily  formoterol, 20 mcg, nebulization, BID  melatonin, 3 mg, oral, Daily  sennosides, 2 tablet, oral, BID  [START ON 2/5/2024] tiotropium, 2 Inhalation, inhalation, Daily    Continuous medications  oxygen, , Last Rate: 2 L/min (02/04/24 0741)    PRN medications  PRN medications: acetaminophen **OR** acetaminophen **OR** acetaminophen, ipratropium-albuteroL, ondansetron ODT **OR** ondansetron, oxyCODONE   Physical Exam  Constitutional:       General: He is not in acute distress.     Appearance: Normal appearance. He is not ill-appearing or toxic-appearing.      Comments: Thin.   HENT:      Head: Normocephalic and atraumatic.      Nose:      Comments: On 2L NC     Mouth/Throat:      Mouth: Mucous membranes are moist.      Comments: Mallampati 3-4, poor oral hygiene  Eyes:      General: No scleral icterus.     Extraocular Movements: Extraocular movements  "intact.      Pupils: Pupils are equal, round, and reactive to light.   Cardiovascular:      Rate and Rhythm: Normal rate and regular rhythm.      Heart sounds: No murmur heard.     No friction rub. No gallop.   Pulmonary:      Effort: Pulmonary effort is normal. No respiratory distress.      Breath sounds: Rhonchi present. No wheezing or rales.      Comments: R CT in place, clamped. Poor air entry with bibasilar rhonchi  Abdominal:      General: There is no distension.      Palpations: Abdomen is soft.      Tenderness: There is no abdominal tenderness.   Musculoskeletal:         General: No tenderness. Normal range of motion.      Cervical back: Neck supple. No rigidity or tenderness.      Right lower leg: No edema.      Left lower leg: No edema.   Lymphadenopathy:      Cervical: No cervical adenopathy.   Skin:     General: Skin is warm and dry.      Coloration: Skin is not jaundiced.      Findings: No bruising.   Neurological:      General: No focal deficit present.      Mental Status: He is alert and oriented to person, place, and time.      Cranial Nerves: No cranial nerve deficit.      Motor: No weakness.   Psychiatric:         Mood and Affect: Mood normal.         Behavior: Behavior normal.     Last Recorded Vitals  Blood pressure 95/52, pulse 87, temperature 36.9 °C (98.4 °F), temperature source Temporal, resp. rate 16, height 1.753 m (5' 9.02\"), weight 50.6 kg (111 lb 8.8 oz), SpO2 94 %.  Intake/Output last 3 Shifts:  I/O last 3 completed shifts:  In: 1780 (35.2 mL/kg) [P.O.:1780]  Out: 2295 (45.4 mL/kg) [Urine:2295 (1.3 mL/kg/hr)]  Weight: 50.6 kg   Relevant Results  No results found for this or any previous visit (from the past 24 hour(s)).  XR chest 1 view    Result Date: 2/2/2024  Interpreted By:  Brenda Salas, STUDY: XR CHEST 1 VIEW;  2/1/2024 9:33 pm   INDICATION: Signs/Symptoms:treated for pneumothorax at outside hospital, chest tube in place   COMPARISON: Chest x-ray 08/26/2022.   ACCESSION " NUMBER(S): [id number]   ORDERING CLINICIAN: CONCHITA BURNS   TECHNIQUE: Portable upright frontal view of the chest was obtained .   FINDINGS: Monitoring wires are overlying the patient.   There is a right-sided chest tube with the tip at the right upper hemithorax.   The cardiomediastinal silhouette is within normal limits.   No focal consolidation, pleural effusion or pneumothorax. Redemonstration of hyperlucent lungs with flattening of the hemidiaphragms, suggestive of emphysema.       1. No radiographic evidence of acute cardiopulmonary process. Right-sided chest tube. 2. Emphysema.       MACRO: None.   Signed by: Brenda Salas 2/2/2024 1:16 AM Dictation workstation:   [id number]    Assessment/Plan   Principal Problem:    Primary spontaneous pneumothorax  Active Problems:    Chronic obstructive pulmonary disease with acute exacerbation (CMS/HCC)  63 YOM with h/o COPD, GERD, SBO, who p/w acute onset SOB x 1 which started while walking his dogs to [facility] ED. SOB both at rest and with activity, associated with chest tightness, and severe to the point it would prevent him from sleeping. In the ED work up revealed R sided spontaneous pneumothorax, subsequently R sided CT was placed. Given need for CT management, he was placed on 4L NC and [place] [facility] [place] Pulmonary is consulted for COPD and chest tube management.      Pneumothorax: spontaneous, first episode, likely due to a bullae, s/p R sided CT placement with near complete resolution      CT clamped, CXR no recurrence, now removed      CXR at 8 pm tonight and tomorrow morning, if no recurrence, can be discharged from pulmonary standpoint      Might need further work up including chest CT      Pain control     COPD: per notes, sees Dr. Johnson as outpatient, currently on Trelegy and albuterol at home, infrequent use of albuterol. No PFT in our EMR. CT report from 2023 read as severe emphysema      Continue Budesonide, formoterol and  Abdirashid Lee      Would DC on home inhalers on DC      FU with private pulmonologist after DC     Respiratory failure: acute with hypoxic due to above. Needs slowly improving.       Continue supplemental O2, wean off as tolerates      Home O2 evaluation before DC     Smoking: active smoker      Smoking cessation counseling      Nicotine patch if needed     DVT prophylaxis: with subcutaneous heparin     Pulmonary will FU while in house.     Yasmine Richard MD

## 2024-02-05 ENCOUNTER — APPOINTMENT (OUTPATIENT)
Dept: RADIOLOGY | Facility: HOSPITAL | Age: 64
DRG: 190 | End: 2024-02-05
Payer: COMMERCIAL

## 2024-02-05 VITALS
SYSTOLIC BLOOD PRESSURE: 96 MMHG | RESPIRATION RATE: 20 BRPM | BODY MASS INDEX: 16.52 KG/M2 | HEIGHT: 69 IN | HEART RATE: 77 BPM | WEIGHT: 111.55 LBS | OXYGEN SATURATION: 92 % | TEMPERATURE: 98.7 F | DIASTOLIC BLOOD PRESSURE: 61 MMHG

## 2024-02-05 PROCEDURE — 71045 X-RAY EXAM CHEST 1 VIEW: CPT | Performed by: RADIOLOGY

## 2024-02-05 PROCEDURE — 94640 AIRWAY INHALATION TREATMENT: CPT

## 2024-02-05 PROCEDURE — 71045 X-RAY EXAM CHEST 1 VIEW: CPT

## 2024-02-05 PROCEDURE — 99239 HOSP IP/OBS DSCHRG MGMT >30: CPT | Performed by: STUDENT IN AN ORGANIZED HEALTH CARE EDUCATION/TRAINING PROGRAM

## 2024-02-05 PROCEDURE — 2500000004 HC RX 250 GENERAL PHARMACY W/ HCPCS (ALT 636 FOR OP/ED): Performed by: HOSPITALIST

## 2024-02-05 PROCEDURE — 2500000002 HC RX 250 W HCPCS SELF ADMINISTERED DRUGS (ALT 637 FOR MEDICARE OP, ALT 636 FOR OP/ED): Performed by: STUDENT IN AN ORGANIZED HEALTH CARE EDUCATION/TRAINING PROGRAM

## 2024-02-05 RX ORDER — DOXYCYCLINE 100 MG/1
100 CAPSULE ORAL 2 TIMES DAILY
Qty: 10 CAPSULE | Refills: 0 | Status: SHIPPED | OUTPATIENT
Start: 2024-02-05 | End: 2024-02-10

## 2024-02-05 RX ADMIN — BUDESONIDE 0.5 MG: 0.5 INHALANT RESPIRATORY (INHALATION) at 07:54

## 2024-02-05 RX ADMIN — FORMOTEROL FUMARATE DIHYDRATE 20 MCG: 20 SOLUTION RESPIRATORY (INHALATION) at 07:55

## 2024-02-05 RX ADMIN — SODIUM CHLORIDE 500 ML: 9 INJECTION, SOLUTION INTRAVENOUS at 02:53

## 2024-02-05 RX ADMIN — IPRATROPIUM BROMIDE AND ALBUTEROL SULFATE 3 ML: 2.5; .5 SOLUTION RESPIRATORY (INHALATION) at 07:55

## 2024-02-05 ASSESSMENT — ENCOUNTER SYMPTOMS
SHORTNESS OF BREATH: 0
COUGH: 1
VOMITING: 0
FEVER: 0
ABDOMINAL PAIN: 0
ABDOMINAL DISTENTION: 0

## 2024-02-05 ASSESSMENT — PAIN SCALES - GENERAL: PAINLEVEL_OUTOF10: 0 - NO PAIN

## 2024-02-05 NOTE — CARE PLAN
The patient's goals for the shift include  no sob    The clinical goals for the shift include pt remains free of injury and chest tube removal      Problem: Daily Care  Goal: Daily care needs are met  Outcome: Progressing     Problem: Psychosocial Needs  Goal: Demonstrates ability to cope with hospitalization/illness  Outcome: Progressing     Problem: Respiratory  Goal: Verbalize decreased shortness of breath this shift and chest tube removed  Outcome: Progressing

## 2024-02-05 NOTE — SIGNIFICANT EVENT
Patient seen, reports no SOB; lungs clear. Right chest sutures cleaned with CHG and allowed to dry. Sutures removed without issue. Site is well approximated, slight redness and without drainage. Dry sterile dressing applied and patient instructed on care.     Meera Figueroa, APRN-CNS

## 2024-02-05 NOTE — SIGNIFICANT EVENT
2/5 at 1056 this resp. Therapist completed home o2 eval.kimberli manzano aware of o2 results. Ok,pt also given copd booklet in a effort to help  pt  quit smoking.pt pleased w info. pt aware to cont w acapella as well.pt alert/receptive to all info

## 2024-02-05 NOTE — DISCHARGE SUMMARY
[facility] Hospitalist Discharge Summary        Santi DickersonOB: 1960MRN: [id number]    ADMIT DATE: ISCHARGE DATE: 2024    PRIMARYCARE PHYSICIAN: Bryan Mckeon DO    VISIT STATUS: Inpatient    CODE STATUS: Full Code    DISCHARGE DIAGNOSES:    Principal Problem:    Primary spontaneous pneumothorax  Active Problems:    Chronic obstructive pulmonary disease with acute exacerbation (CMS/[facility])      CONSULTANTS:  Pulm    HOSPITAL COURSE:    #Secondary spontaneous PTX in setting of COPD  #COPD not in exacerbation  - appreciated pulm  - chest tube removed, repeat CXR no PTX  - continue Trelegy inhaler at home    #PNA  - Rx doxy 5 days (penicillin allergy)    He is medically stable for DC to home today    DAY OF DISCHARGE:  Review of Systems   Constitutional:  Negative for fever.   Respiratory:  Positive for cough. Negative for shortness of breath.    Cardiovascular:  Negative for chest pain.   Gastrointestinal:  Negative for abdominal distention, abdominal pain and vomiting.       Patient Vitals for the past 24 hrs:   BP Temp Temp src Pulse Resp SpO2   24 1100 96/61 37.1 °C (98.7 °F) Oral 77 20 92 %   24 1056 -- -- -- -- -- 90 %   24 1046 -- -- -- -- -- 94 %   24 1033 -- -- -- -- -- 95 %   24 0747 92/53 -- -- 78 18 94 %   24 0300 94/53 37.1 °C (98.8 °F) Oral 78 18 96 %   24 0050 (!) 87/47 36.7 °C (98.1 °F) Oral 82 18 94 %   24 2100 97/58 -- -- 80 -- 94 %   24 1942 -- -- -- -- -- 95 %   24 1700 102/67 38.4 °C (101.2 °F) Temporal 92 16 92 %   24 1418 -- -- -- -- -- 94 %   24 1230 100/62 37 °C (98.6 °F) Oral 89 16 92 %       Average, Min, and Max forlast 24 hours Vitals:  TEMPERATURE: Temp  Av.3 °C (99.1 °F)  Min: 36.7 °C (98.1 °F)  Max: 38.4 °C (101.2 °F)    RESPIRATIONS RANGE: Resp  Av.7  Min: 16  Max: 20    PULSE RANGE: Pulse  Av.3  Min: 77  Max: 92    BLOOD PRESSURE RANGE: Systolic (24hrs), Av , Min:87 , Max:102   ;  Diastolic (24hrs), Av, Min:47, Max:67      PULSE OXIMETRYRANGE: SpO2  Av.5 %  Min: 90 %  Max: 96 %    I/O last 3 completed shifts:  In: 1700 (33.6 mL/kg) [P.O.:1200; IV Piggyback:500]  Out: - (0 mL/kg)   Weight: 50.6 kg     Physical Exam  Constitutional:       General: He is not in acute distress.  Cardiovascular:      Rate and Rhythm: Normal rate and regular rhythm.   Pulmonary:      Effort: Pulmonary effort is normal.      Breath sounds: Normal breath sounds.   Abdominal:      General: There is no distension.      Palpations: Abdomen is soft.   Neurological:      Mental Status: He is alert. Mental status is at baseline.           Discharge Meds       Your medication list        START taking these medications        Instructions Last Dose Given Next Dose Due   doxycycline 100 mg capsule  Commonly known as: Vibramycin      Take 1 capsule (100 mg) by mouth 2 times a day for 5 days. Take with at least 8 ounces (large glass) of water, do not lie down for 30 minutes after              CONTINUE taking these medications        Instructions Last Dose Given Next Dose Due   albuterol 90 mcg/actuation inhaler           albuterol 0.63 mg/3 mL nebulizer solution           fluticasone-umeclidin-vilanter 100-62.5-25 mcg blister with device  Commonly known as: TRELEGY-ELLIPTA                     Where to Get Your Medications        These medications were sent to [id number] - [place], [place] - [address] [facility]  [address], [facility] [address]      Phone: [phone]   doxycycline 100 mg capsule           FOLLOW UP TESTING, PENDING RESULTS OR REFERRALS AT FOLLOW UP VISIT:   [X] Yes   [ ] No    DISPOSITION: Home    FACILITY/HOME CARE AGENCY NAME: NA    Follow up with PCP Bryan Mckeon, DO    Outpatient Follow-Up Appointments  No future appointments.    I personally examined the patient and reviewed chart, data, labs and radiology reports.  Plan of care was discussed with patient, all questions  answered.    DISCHARGE TIME: > 30 minutes providing counseling or in coordination of care. Total time on this day of visit includes record and documentation review before and after visit including documentation and time not explicitly included on EMR time stamp.    Aline Clinton MD  [employer]

## 2024-02-05 NOTE — CARE PLAN
The patient's goals for the shift include      The clinical goals for the shift include remain HDS

## 2024-02-05 NOTE — PROGRESS NOTES
Santi Rivera is a 63 y.o. male on day 4 of admission presenting with Primary spontaneous pneumothorax.    Plan: CXR to be done this morning. Last nights CXR shows improvement. If continued improvement in results of CXR this AM, cleared by Pulm standpoint and possible DC this afternoon. Pending Medical clearance.  Disposition: Home with spouse  Barrier: CXR results  ADOD:  today    Tere Stephens RN

## 2024-02-06 ENCOUNTER — PATIENT OUTREACH (OUTPATIENT)
Dept: CARDIOLOGY | Facility: CLINIC | Age: 64
End: 2024-02-06
Payer: COMMERCIAL

## 2024-02-08 NOTE — DOCUMENTATION CLARIFICATION NOTE
PATIENT:               ANDREA MICHAEL  ACCT #:                  [id number]  MRN:                       [id number]  :                       1960  ADMIT DATE:       2024 4:01 PM  DISCH DATE:        2024 12:43 PM  RESPONDING PROVIDER #:        [id number]          PROVIDER RESPONSE TEXT:    COPD was exacerbated during this admission but not POA    CDI QUERY TEXT:    [facility]_Generic        Instruction:    Based on your assessment of the patient and the clinical information, please provide the requested documentation by clicking on the appropriate radio button and enter any additional information if prompted.    Question: Please further clarify after study the diagnosis of COPD[[ COPD was not exacerbated at any point during this admission :: COPD was not exacerbated at any point during this admission.    When answering this query, please exercise your independent professional judgment. The fact that a question is being asked, does not imply that any particular answer is desired or expected.    The patient's clinical indicators include:  Clinical Information: Patient with?h/o COPD, GERD, SBO, who p/w acute onset SOB x 1 which started while walking his dogs to OSH ED. SOB both at rest and with activity, associated with chest tightness, and severe to the point it would prevent him from sleeping. In the ED work up revealed R sided spontaneous pneumothorax, subsequently R sided CT was placed. Given need for CT management, he was placed on 4L NC and transferred [place] [facility] [place]. Pulmonary is consulted for COPD and chest tube management.?    On Discharge Summary, Dr. Guzman documented both COPD with exacerbation and COPD not in exacerbation.    Clinical Indicators: Spontaneous Ptx    Treatment: O2 by nasal canula, inhaler and aerosol treatments    Risk Factors: smoker, pneumonia  Options provided:  -- COPD Exacerbation present on admission  -- COPD was exacerbated during this admission but not POA  -- Other  - I will add my own diagnosis  -- Refer to Clinical Documentation Reviewer    Query created by: Reena Tran on 2/8/2024 9:31 AM      Electronically signed by:  KAMALJIT BARBER MD 2/8/2024 9:48 AM

## 2024-02-15 NOTE — DOCUMENTATION CLARIFICATION NOTE
"    PATIENT:               ANDREA MICHAEL  ACCT #:                  [id number]  MRN:                       [id number]  :                       1960  ADMIT DATE:       2024 4:01 PM  DISCH DATE:        2024 12:43 PM  RESPONDING PROVIDER #:        [id number]          PROVIDER RESPONSE TEXT:    Secondary spontaneous PTX in setting of COPD    CDI QUERY TEXT:    [facility]_Conflicting Documentation        Instruction:    Based on your assessment of the patient and the clinical information, please provide the requested documentation by clicking on the appropriate radio button and enter any additional information if prompted.    Question: Due to conflicting documentation, please clarify the diagnosis of spontaneous pneumothorax    When answering this query, please exercise your independent professional judgment. The fact that a question is being asked, does not imply that any particular answer is desired or expected.    The patient's clinical indicators include:  Clinical Information: 63 year old man transferred from OSH for spontaneous pneumothorax-was walking dog and had SOB. Pt's PMH includes COPD, smoker, and GERD.    Clinical Indicators:  Discharge Summary : Dr. Barber  \"Principal Problem: Primary spontaneous pneumothorax\"  \"Secondary spontaneous PTX in setting of COPD\"    Pulmonary Progress Notes : Dr. Richard  \"Pneumothorax: spontaneous, first episode, likely due to a bullae, s/p R sided CT placement with near complete resolution.\"    Treatment: Chest tube    Risk Factors: age, COPD  Options provided:  -- Secondary spontaneous PTX in setting of COPD  -- Primary spontaneous pneumothorax  -- Other - I will add my own diagnosis  -- Refer to Clinical Documentation Reviewer    Query created by: Naa Pérez on 2024 4:50 PM      Electronically signed by:  KAMALJIT BARBER MD 2/15/2024 6:50 AM          "

## 2025-05-16 ENCOUNTER — HOSPITAL ENCOUNTER (OUTPATIENT)
Dept: RADIOLOGY | Facility: CLINIC | Age: 65
Discharge: HOME | End: 2025-05-16
Payer: MEDICARE

## 2025-05-16 DIAGNOSIS — R05.9 COUGH, UNSPECIFIED: ICD-10-CM

## 2025-05-16 DIAGNOSIS — R06.02 SHORTNESS OF BREATH: ICD-10-CM

## 2025-05-16 PROCEDURE — 71046 X-RAY EXAM CHEST 2 VIEWS: CPT
